# Patient Record
Sex: FEMALE | Race: WHITE | NOT HISPANIC OR LATINO | Employment: OTHER | URBAN - METROPOLITAN AREA
[De-identification: names, ages, dates, MRNs, and addresses within clinical notes are randomized per-mention and may not be internally consistent; named-entity substitution may affect disease eponyms.]

---

## 2018-09-28 ENCOUNTER — OFFICE VISIT CONVERTED (OUTPATIENT)
Dept: FAMILY MEDICINE CLINIC | Facility: CLINIC | Age: 63
End: 2018-09-28
Attending: NURSE PRACTITIONER

## 2018-12-06 ENCOUNTER — CONVERSION ENCOUNTER (OUTPATIENT)
Dept: OTHER | Facility: HOSPITAL | Age: 63
End: 2018-12-06

## 2018-12-06 ENCOUNTER — OFFICE VISIT CONVERTED (OUTPATIENT)
Dept: CARDIOLOGY | Facility: CLINIC | Age: 63
End: 2018-12-06
Attending: INTERNAL MEDICINE

## 2019-01-02 ENCOUNTER — OFFICE VISIT CONVERTED (OUTPATIENT)
Dept: FAMILY MEDICINE CLINIC | Facility: CLINIC | Age: 64
End: 2019-01-02
Attending: NURSE PRACTITIONER

## 2019-03-20 ENCOUNTER — OFFICE VISIT CONVERTED (OUTPATIENT)
Dept: OTOLARYNGOLOGY | Facility: CLINIC | Age: 64
End: 2019-03-20
Attending: OTOLARYNGOLOGY

## 2019-04-29 ENCOUNTER — CONVERSION ENCOUNTER (OUTPATIENT)
Dept: OTOLARYNGOLOGY | Facility: CLINIC | Age: 64
End: 2019-04-29

## 2019-04-29 ENCOUNTER — OFFICE VISIT CONVERTED (OUTPATIENT)
Dept: OTOLARYNGOLOGY | Facility: CLINIC | Age: 64
End: 2019-04-29
Attending: OTOLARYNGOLOGY

## 2019-07-02 ENCOUNTER — CONVERSION ENCOUNTER (OUTPATIENT)
Dept: FAMILY MEDICINE CLINIC | Facility: CLINIC | Age: 64
End: 2019-07-02

## 2019-07-02 ENCOUNTER — OFFICE VISIT CONVERTED (OUTPATIENT)
Dept: FAMILY MEDICINE CLINIC | Facility: CLINIC | Age: 64
End: 2019-07-02
Attending: NURSE PRACTITIONER

## 2019-12-23 ENCOUNTER — HOSPITAL ENCOUNTER (OUTPATIENT)
Dept: FAMILY MEDICINE CLINIC | Facility: CLINIC | Age: 64
Discharge: HOME | End: 2019-12-23
Attending: NURSE PRACTITIONER

## 2019-12-23 ENCOUNTER — OFFICE VISIT CONVERTED (OUTPATIENT)
Dept: FAMILY MEDICINE CLINIC | Facility: CLINIC | Age: 64
End: 2019-12-23
Attending: NURSE PRACTITIONER

## 2019-12-24 LAB
CONV LAST MENSTURAL PERIOD: NORMAL
SPECIMEN SOURCE: NORMAL
SPECIMEN SOURCE: NORMAL
THIN PREP CVX: NORMAL

## 2020-08-04 ENCOUNTER — OFFICE VISIT CONVERTED (OUTPATIENT)
Dept: FAMILY MEDICINE CLINIC | Facility: CLINIC | Age: 65
End: 2020-08-04
Attending: NURSE PRACTITIONER

## 2021-02-09 ENCOUNTER — HOSPITAL ENCOUNTER (OUTPATIENT)
Dept: LAB | Facility: HOSPITAL | Age: 66
Discharge: HOME | End: 2021-02-09
Attending: NURSE PRACTITIONER

## 2021-02-09 LAB
ALBUMIN SERPL-MCNC: 4.3 G/DL (ref 3.5–5)
ALBUMIN/GLOB SERPL: 2 {RATIO} (ref 1.4–2.6)
ALP SERPL-CCNC: 57 U/L (ref 43–160)
ALT SERPL-CCNC: 11 U/L (ref 10–40)
ANION GAP SERPL CALC-SCNC: 13 MMOL/L (ref 8–19)
AST SERPL-CCNC: 18 U/L (ref 15–50)
BASOPHILS # BLD AUTO: 0.04 10*3/UL (ref 0–0.2)
BASOPHILS NFR BLD AUTO: 0.8 % (ref 0–3)
BILIRUB SERPL-MCNC: 0.45 MG/DL (ref 0.2–1.3)
BUN SERPL-MCNC: 13 MG/DL (ref 5–25)
BUN/CREAT SERPL: 15 {RATIO} (ref 6–20)
CALCIUM SERPL-MCNC: 9.3 MG/DL (ref 8.7–10.4)
CHLORIDE SERPL-SCNC: 104 MMOL/L (ref 99–111)
CHOLEST SERPL-MCNC: 226 MG/DL (ref 107–200)
CHOLEST/HDLC SERPL: 3.1 {RATIO} (ref 3–6)
CONV ABS IMM GRAN: 0.02 10*3/UL (ref 0–0.2)
CONV CO2: 28 MMOL/L (ref 22–32)
CONV IMMATURE GRAN: 0.4 % (ref 0–1.8)
CONV TOTAL PROTEIN: 6.4 G/DL (ref 6.3–8.2)
CREAT UR-MCNC: 0.86 MG/DL (ref 0.5–0.9)
DEPRECATED RDW RBC AUTO: 43.6 FL (ref 36.4–46.3)
EOSINOPHIL # BLD AUTO: 0.1 10*3/UL (ref 0–0.7)
EOSINOPHIL # BLD AUTO: 2.1 % (ref 0–7)
ERYTHROCYTE [DISTWIDTH] IN BLOOD BY AUTOMATED COUNT: 12.6 % (ref 11.7–14.4)
GFR SERPLBLD BASED ON 1.73 SQ M-ARVRAT: >60 ML/MIN/{1.73_M2}
GLOBULIN UR ELPH-MCNC: 2.1 G/DL (ref 2–3.5)
GLUCOSE SERPL-MCNC: 90 MG/DL (ref 65–99)
HCT VFR BLD AUTO: 43.1 % (ref 37–47)
HDLC SERPL-MCNC: 73 MG/DL (ref 40–60)
HGB BLD-MCNC: 13.9 G/DL (ref 12–16)
LDLC SERPL CALC-MCNC: 134 MG/DL (ref 70–100)
LYMPHOCYTES # BLD AUTO: 1.46 10*3/UL (ref 1–5)
LYMPHOCYTES NFR BLD AUTO: 30.9 % (ref 20–45)
MCH RBC QN AUTO: 30.3 PG (ref 27–31)
MCHC RBC AUTO-ENTMCNC: 32.3 G/DL (ref 33–37)
MCV RBC AUTO: 93.9 FL (ref 81–99)
MONOCYTES # BLD AUTO: 0.37 10*3/UL (ref 0.2–1.2)
MONOCYTES NFR BLD AUTO: 7.8 % (ref 3–10)
NEUTROPHILS # BLD AUTO: 2.73 10*3/UL (ref 2–8)
NEUTROPHILS NFR BLD AUTO: 58 % (ref 30–85)
NRBC CBCN: 0 % (ref 0–0.7)
OSMOLALITY SERPL CALC.SUM OF ELEC: 292 MOSM/KG (ref 273–304)
PLATELET # BLD AUTO: 235 10*3/UL (ref 130–400)
PMV BLD AUTO: 10.4 FL (ref 9.4–12.3)
POTASSIUM SERPL-SCNC: 4 MMOL/L (ref 3.5–5.3)
RBC # BLD AUTO: 4.59 10*6/UL (ref 4.2–5.4)
SODIUM SERPL-SCNC: 141 MMOL/L (ref 135–147)
TRIGL SERPL-MCNC: 94 MG/DL (ref 40–150)
TSH SERPL-ACNC: 0.8 M[IU]/L (ref 0.27–4.2)
VLDLC SERPL-MCNC: 19 MG/DL (ref 5–37)
WBC # BLD AUTO: 4.72 10*3/UL (ref 4.8–10.8)

## 2021-02-10 ENCOUNTER — HOSPITAL ENCOUNTER (OUTPATIENT)
Dept: VACCINE CLINIC | Facility: HOSPITAL | Age: 66
Discharge: HOME | End: 2021-02-10
Attending: INTERNAL MEDICINE

## 2021-02-24 ENCOUNTER — CONVERSION ENCOUNTER (OUTPATIENT)
Dept: FAMILY MEDICINE CLINIC | Facility: CLINIC | Age: 66
End: 2021-02-24

## 2021-02-24 ENCOUNTER — HOSPITAL ENCOUNTER (OUTPATIENT)
Dept: FAMILY MEDICINE CLINIC | Facility: CLINIC | Age: 66
Discharge: HOME | End: 2021-02-24
Attending: NURSE PRACTITIONER

## 2021-02-24 ENCOUNTER — OFFICE VISIT CONVERTED (OUTPATIENT)
Dept: FAMILY MEDICINE CLINIC | Facility: CLINIC | Age: 66
End: 2021-02-24
Attending: NURSE PRACTITIONER

## 2021-02-24 ENCOUNTER — HOSPITAL ENCOUNTER (OUTPATIENT)
Dept: GENERAL RADIOLOGY | Facility: HOSPITAL | Age: 66
Discharge: HOME | End: 2021-02-24
Attending: NURSE PRACTITIONER

## 2021-02-24 LAB
APPEARANCE UR: CLEAR
BILIRUB UR QL: NEGATIVE
COLOR UR: YELLOW
CONV COLLECTION SOURCE (UA): NORMAL
CONV UROBILINOGEN IN URINE BY AUTOMATED TEST STRIP: 0.2 {EHRLICHU}/DL (ref 0.1–1)
GLUCOSE UR QL: NEGATIVE MG/DL
HEMOCCULT STL QL IA: NEGATIVE
HGB UR QL STRIP: NEGATIVE
KETONES UR QL STRIP: NEGATIVE MG/DL
LEUKOCYTE ESTERASE UR QL STRIP: NEGATIVE
NITRITE UR QL STRIP: NEGATIVE
PH UR STRIP.AUTO: 7 [PH] (ref 5–8)
PROT UR QL: NEGATIVE MG/DL
SP GR UR: 1.01 (ref 1–1.03)

## 2021-02-25 ENCOUNTER — HOSPITAL ENCOUNTER (OUTPATIENT)
Dept: GENERAL RADIOLOGY | Facility: HOSPITAL | Age: 66
Discharge: HOME | End: 2021-02-25
Attending: NURSE PRACTITIONER

## 2021-02-26 LAB — BACTERIA UR CULT: NORMAL

## 2021-03-11 ENCOUNTER — HOSPITAL ENCOUNTER (OUTPATIENT)
Dept: VACCINE CLINIC | Facility: HOSPITAL | Age: 66
Discharge: HOME | End: 2021-03-11
Attending: INTERNAL MEDICINE

## 2021-03-15 ENCOUNTER — HOSPITAL ENCOUNTER (OUTPATIENT)
Dept: MRI IMAGING | Facility: HOSPITAL | Age: 66
Discharge: HOME | End: 2021-03-15
Attending: NURSE PRACTITIONER

## 2021-04-12 ENCOUNTER — OFFICE VISIT CONVERTED (OUTPATIENT)
Dept: ORTHOPEDIC SURGERY | Facility: CLINIC | Age: 66
End: 2021-04-12
Attending: ORTHOPAEDIC SURGERY

## 2021-05-11 NOTE — H&P
History and Physical      Patient Name: Jessie Peck   Patient ID: [id number]   Sex: Female   YOB: 1955    Primary Care Provider: Percy PERERA   Referring Provider: Percy PERERA    Visit Date: April 12, 2021    Provider: Edy Ortega MD   Location: [facility]   Location Address: [address] [place] [address]  [id number]   Location Phone: [phone]          Chief Complaint  · Right Shoulder Pain      History Of Present Illness  Jessie Peck is a 66 year old /White female who presents today to [facility].      Patient presents today for an evaluation of right shoulder pain. She states she was moving mulch last year in the spring resulting in right shoulder pain. She states shoulder pain has been on and off over the last year. She started having increasing pain recently but pain has subsided. She states she is managing her pain with Aspirin cream. She reports noticing pain with range of motion. She hasn't had any formal treatment done for her right shoulder.       Past Medical History  Cancer; Ear fullness, bilateral; Ear itching; Hearing loss; Hyperthyroidism; Osteopenia; Screening Mammogram; Skin cancer; Thyroid disorder; Tinnitus of both ears         Past Surgical History  Appendectomy; Colonoscopy         Medication List  Calcium 500 500 mg calcium (1,250 mg) oral tablet; levothyroxine 125 mcg oral tablet; Vitamin D3 1,000 unit oral capsule         Allergy List  NO KNOWN DRUG ALLERGIES       Allergies Reconciled  Family Medical History  Esophagus Neoplasm, Malignant; Stroke; Heart Disease; Cancer, Unspecified; Skin Carcinoma; Osteoporosis; Family history of Arthritis         Social History  Alcohol (Current some day); Alcohol Use (Current some day); lives with spouse; .; Recreational Drug Use (Never); Retired.; Tobacco (Never)         Immunizations  Name Date Admin   Influenza 11/01/2018         Review of  "Systems  · Constitutional  o Denies  o : fever, chills, weight loss  · Cardiovascular  o Denies  o : chest pain, shortness of breath  · Gastrointestinal  o Denies  o : liver disease, heartburn, nausea, blood in stools  · Genitourinary  o Denies  o : painful urination, blood in urine  · Integument  o Denies  o : rash, itching  · Neurologic  o Denies  o : headache, weakness, loss of consciousness  · Musculoskeletal  o Denies  o : painful, swollen joints  · Psychiatric  o Denies  o : drug/alcohol addiction, anxiety, depression      Vitals  Date Time BP Position Site L\R Cuff Size HR RR TEMP (F) WT  HT  BMI kg/m2 BSA m2 O2 Sat FR L/min FiO2        04/12/2021 02:41 PM      68 - R   146lbs 16oz 5'  6.5\" 23.37 1.77 98 %            Physical Examination  · Constitutional  o Appearance  o : well developed, well-nourished, no obvious deformities present  · Head and Face  o Head  o :   § Inspection  § : normocephalic  o Face  o :   § Inspection  § : no facial lesions  · Eyes  o Conjunctivae  o : conjunctivae normal  o Sclerae  o : sclerae white  · Ears, Nose, Mouth and Throat  o Ears  o :   § External Ears  § : appearance within normal limits  § Hearing  § : intact  o Nose  o :   § External Nose  § : appearance normal  · Neck  o Inspection/Palpation  o : normal appearance  o Range of Motion  o : full range of motion  · Respiratory  o Respiratory Effort  o : breathing unlabored  o Inspection of Chest  o : normal appearance  o Auscultation of Lungs  o : no audible wheezing or rales  · Cardiovascular  o Heart  o : regular rate  · Gastrointestinal  o Abdominal Examination  o : soft and non-tender  · Skin and Subcutaneous Tissue  o General Inspection  o : intact, no rashes  · Psychiatric  o General  o : Alert and oriented x3  o Judgement and Insight  o : judgment and insight intact  o Mood and Affect  o : mood normal, affect appropriate  · Right Shoulder  o Inspection  o : Sensation grossly intact. Neurovascular intact. Skin " intact. No swelling, skin discoloration or atrophy. Positive pulses. Good tone of deltoid, biceps, triceps, wrist extensors, and wrist flexors. Full forward flexion. Pain with empty can testing. 4 out of 5 RTC strength. Full cross body adduction. Pain with Hawkin's. Pain with Neer's. Tender biceps tendon. Non-tender shoulder.   · Imaging  o Imaging  o : 3/15/21 RIGHT SHOULDER MRI: 1. Moderate acromioclavicular and moderate glenohumeral osteoarthritis 2. Intrasubstance tears of the supraspinatus and subscapularis tendons. 3. Mild supraspinatus tendinopathy 4. Multiple split tears of the biceps long head tendon 5. Tears and degenerative changes of the glenoid labrum, as above 6. Small glenohumeral joint effusion 7. Edema within the deltoid muscle favored to represent a muscular strain. Denervation edema would also be in the differential.      2/24/21 RIGHT SHOULDER X-RAY: 1. No acute fracture or malalignment     2. Mild glenohumeral and acromioclavicular osteoarthritis     3. Indeterminate 0.5 cm nodular density in the mid right lung.  Follow-up chest PA and lateral is recommended to further evaluate.                Assessment  · Right shoulder pain, unspecified chronicity     719.41/M25.511  · Right Rotator Cuff Tendinitis     726.90/M77.9      Plan  · Medications  o Medications have been Reconciled  o Transition of Care or Provider Policy  · Instructions  o Dr. Ortega saw and examined the patient and agrees with plan.   o X-rays reviewed by Dr. Ortega.  o Reviewed the patient's Past Medical, Social, and Family history as well as the ROS at today's visit, no changes.  o Call or return if worsening symptoms.  o Follow Up PRN.  o Discussed treatment plans and diagnosis with the patient. Patient states she wanted to assure she didn't have any major damage to her shoulder. Patient states her symptoms have improved and she will hold off on an injection until symptoms worsen again.   o The above service was scribed by Reva  Rolando on my behalf and I attest to the accuracy of the note. [email or web address]  · Referrals  o ID: [id number] Date: 04/07/2021 Type: Inbound  Specialty: Orthopedic Surgery            Electronically Signed by: Reva Marshall-, Other -Author on April 15, 2021 09:15:28 AM  Electronically Co-signed by: Edy Ortega MD -Reviewer on April 18, 2021 07:57:14 PM

## 2021-05-13 NOTE — PROGRESS NOTES
Progress Note      Patient Name: Jessie Peck   Patient ID: [id number]   Sex: Female   YOB: 1955    Primary Care Provider: ePrcy PERERA   Referring Provider: Percy PERERA    Visit Date: August 4, 2020    Provider: TREVER Cheng   Location: [facility]   Location Address: [address] [place] [address], [address] [place] [address]  [id number]   Location Phone: [phone]          Chief Complaint     The patient is here for anxiety and she has bilateral foot pain for one month.       History Of Present Illness  Jessie Peck is a 65 year old /White female who presents for evaluation and treatment of:      insomnia and anxiety:  Having a lot of changes.  Loud air conditioners and  snoring and taking care of sons dogs so not getting sleeping well.    bilateral foot pain.  Pain when starts walking but gets better.  Sudden onset.  good shoes       Past Medical History  Disease Name Date Onset Notes   Ear fullness, bilateral --  --    Ear itching --  --    Hearing loss --  --    Hyperthyroidism --  --    Osteopenia --  --    Screening Mammogram 02/26/2019 --    Skin cancer --  --    Tinnitus of both ears --  --          Past Surgical History  Procedure Name Date Notes   Appendectomy 1967 --    Colonoscopy 2008, 1/19/2018 --          Medication List  Name Date Started Instructions   Calcium 500 500 mg calcium (1,250 mg) oral tablet  take 1 tablet by oral route daily   levothyroxine 125 mcg oral tablet 04/20/2020 take 1 tablet (125 mcg) by oral route once daily for 90 days   Shingrix (PF) 50 mcg/0.5 mL intramuscular suspension for reconstitution 01/02/2019 inject 0.5 milliliter (50 mcg) by intramuscular route once repeat 0.5 mL dose 2 to 6 months after the first dose for 1 day   Vitamin D3 1,000 unit oral capsule  take 1 capsule by oral route daily         Allergy List  Allergen Name Date Reaction Notes   NO KNOWN DRUG ALLERGIES --  --  --          Family Medical  "History  Disease Name Relative/Age Notes   Esophagus Neoplasm, Malignant Grandfather (maternal)/   --    Stroke Aunt/  Father/  Grandmother (maternal)/   --    Heart Disease Aunt/  Father/  Uncle/   --    Skin Carcinoma Father/80   --          Social History  Finding Status Start/Stop Quantity Notes   Alcohol Current some day --/-- --  1-4 drinks per week   Tobacco Never --/-- --  --          Immunizations  NameDate Admin Mfg Trade Name Lot Number Route Inj VIS Given VIS Publication   [id number]/01/2018 SKB Fluarix, quadrivalent, preservative free [id number] NE NE 07/02/2019    Comments:          Review of Systems  · Constitutional  o Denies  o : fever, fatigue, weight loss, weight gain  · Cardiovascular  o Denies  o : lower extremity edema, claudication, chest pressure, palpitations  · Respiratory  o Denies  o : shortness of breath, wheezing, cough, hemoptysis, dyspnea on exertion  · Gastrointestinal  o Denies  o : nausea, vomiting, diarrhea, constipation, abdominal pain  · Musculoskeletal  o Admits  o : foot pain  · Psychiatric  o Admits  o : anxiety      Vitals  Date Time BP Position Site L\R Cuff Size HR RR TEMP (F) WT  HT  BMI kg/m2 BSA m2 O2 Sat        08/04/2020 07:53 /88 Sitting    56 - R 16 98.8 153lbs 0oz 5'  6.5\" 24.32 1.8 98 %          Physical Examination  · Constitutional  o Appearance  o : well-nourished, well developed, alert, in no acute distress  · Eyes  o Conjunctivae  o : conjunctivae normal  o Sclerae  o : sclerae white  o Pupils and Irises  o : pupils equal, round, and reactive to light and accommodation bilaterally  o Corneas  o : tear film normal, no lesions present  o Eyelids/Ocular Adnexae  o : eyelid appearance normal, no exudates present, eye moisture level normal  · Respiratory  o Respiratory Effort  o : breathing unlabored  o Inspection of Chest  o : normal appearance, no retractions  o Auscultation of Lungs  o : normal breath sounds throughout  · Cardiovascular  o Heart  o : "   § Auscultation of Heart  § : regular rate and rhythm without murmur, PMI normal  o Peripheral Vascular System  o :   § Extremities  § : no cyanosis, clubbing or edema; less than 2 second refill noted  · Musculoskeletal  o General  o : No joint swelling or deformity noted. Muscle tone, strength and development grossly normal.  · Skin and Subcutaneous Tissue  o General Inspection  o : no rashes or lesions present, no areas of discoloration  · Neurologic  o Mental Status Examination  o : judgement, insight intact, modd and affect appropriate  o Motor Examination  o : strength grossly intact in all four extremities  o Gait and Station  o : normal gait, able to stand without difficulty          Assessment  · Screening for depression     V79.0/Z13.89  · Insomnia, unspecified     780.52/G47.00  · Situational anxiety     300.09/F41.8  · Plantar fasciitis, bilateral     728.71/M72.2       Will try to retrain her sleep pattern.  To see if this helps and if she continues to have anxiety will put her on something that she can take during the day.  She concurs with this plan.       Plan  · Orders  o Annual depression screening, 15 minutes (, [address]6467) - V79.0/Z13.89 - 08/04/2020  o ACO-18: Negative screen for clinical depression using a standardized tool () - V79.0/Z13.89 - 08/04/2020   Insomnia and nerves over Covid.  o ACO-39: Current medications updated and reviewed () - - 08/04/2020  o ACO-14: Influenza immunization administered or previously received () - - 08/04/2020  o ACO-19: Colorectal cancer screening results documented and reviewed (3017F) - - 08/04/2020  · Medications  o hydroxyzine HCl 25 mg oral tablet   SIG: take 1 tablet by oral route once a day (at bedtime) for 30 days   DISP: (30) tablets with 1 refills  Prescribed on 08/04/2020     o Medications have been Reconciled  o Transition of Care or Provider Policy  · Instructions  o Depression Screen completed and scanned into the EMR under the  designated folder within the patient's documents.  o Today's PHQ-9 result is ___  o The provider screening met the required time of 15 minutes.  o Patient was educated/instructed on their diagnosis, treatment and medications prior to discharge from the clinic today.  o Minutes spent with patient including greater than 50% in Education/Counseling/Care Coordination.  o Time spent with the patient was minutes, more than 50% face to face.            Electronically Signed by: TREVER Cheng -Author on August 4, 2020 08:40:30 AM

## 2021-05-14 VITALS
HEIGHT: 66 IN | HEART RATE: 80 BPM | DIASTOLIC BLOOD PRESSURE: 80 MMHG | OXYGEN SATURATION: 98 % | RESPIRATION RATE: 16 BRPM | BODY MASS INDEX: 23.63 KG/M2 | WEIGHT: 147 LBS | SYSTOLIC BLOOD PRESSURE: 122 MMHG | TEMPERATURE: 98.7 F

## 2021-05-14 VITALS — OXYGEN SATURATION: 98 % | WEIGHT: 147 LBS | BODY MASS INDEX: 23.63 KG/M2 | HEART RATE: 68 BPM | HEIGHT: 66 IN

## 2021-05-14 NOTE — PROGRESS NOTES
Progress Note      Patient Name: Jessie Peck   Patient ID: [id number]   Sex: Female   YOB: 1955    Primary Care Provider: Percy PERERA   Referring Provider: Percy PERERA    Visit Date: February 24, 2021    Provider: TREVER Cheng   Location: [facility]   Location Address: [address] [place] [address], [address]  [id number]   Location Phone: [phone]          Chief Complaint  · Annual Exam  · PAP exam  · (Health Maintainence Information Reviewed Under Results)  · f/u hypthyroid  · urine incontinence      History Of Present Illness  Jessie Peck is a 65 year old /White female who presents for evaluation and treatment of:   No current complaints.      If the x-ray is negative may need to get an MRI.  Some urine incontinence.  Wants to to have a urine checked.    Discussed her elevated cholesterol.  All the rest of her labs were normal and within range.  And she has been trying to change some dietary options to be able to help lower that.    Not having any other issues.  Hypothyroidism she is stable    Not having any issues.  Last colonoscopy was at 62.  Does not plan another till she is 72..       Past Medical History  Disease Name Date Onset Notes   Ear fullness, bilateral --  --    Ear itching --  --    Hearing loss --  --    Hyperthyroidism --  --    Osteopenia --  --    Screening Mammogram 02/26/2019 --    Skin cancer --  --    Tinnitus of both ears --  --          Past Surgical History  Procedure Name Date Notes   Appendectomy 1967 --    Colonoscopy 2008, 1/19/2018 --          Medication List  Name Date Started Instructions   Calcium 500 500 mg calcium (1,250 mg) oral tablet  take 1 tablet by oral route daily   hydroxyzine HCl 25 mg oral tablet 08/04/2020 take 1 tablet by oral route once a day (at bedtime) for 30 days   levothyroxine 125 mcg oral tablet 10/19/2020 take 1 tablet (125 mcg) by oral route once daily for 90 days  "  Shingrix (PF) 50 mcg/0.5 mL intramuscular suspension for reconstitution 01/02/2019 inject 0.5 milliliter (50 mcg) by intramuscular route once repeat 0.5 mL dose 2 to 6 months after the first dose for 1 day   Vitamin D3 1,000 unit oral capsule  take 1 capsule by oral route daily         Allergy List  Allergen Name Date Reaction Notes   NO KNOWN DRUG ALLERGIES --  --  --          Family Medical History  Disease Name Relative/Age Notes   Esophagus Neoplasm, Malignant Grandfather (maternal)/   --    Stroke Aunt/  Father/  Grandmother (maternal)/   --    Heart Disease Aunt/  Father/  Uncle/   --    Skin Carcinoma Father/80   --          Social History  Finding Status Start/Stop Quantity Notes   Alcohol Current some day --/-- --  1-4 drinks per week   Tobacco Never --/-- --  --          Immunizations  NameDate Admin Mfg Trade Name Lot Number Route Inj VIS Given VIS Publication   [id number]/01/2018 SKB Fluarix, quadrivalent, preservative free [id number] NE NE 07/02/2019    Comments:          Review of Systems  · Constitutional  o Denies  o : fever, fatigue, weight loss, weight gain  · Cardiovascular  o Denies  o : lower extremity edema, claudication, chest pressure, palpitations  · Respiratory  o Denies  o : shortness of breath, wheezing, cough, hemoptysis, dyspnea on exertion  · Gastrointestinal  o Denies  o : nausea, vomiting, diarrhea, constipation, abdominal pain  · Genitourinary  o Admits  o : incontinence      Vitals  Date Time BP Position Site L\R Cuff Size HR RR TEMP (F) WT  HT  BMI kg/m2 BSA m2 O2 Sat FR L/min FiO2 HC       02/24/2021 08:05 /80 Sitting    80 - R 16 98.7 146lbs 16oz 5'  6.5\" 23.37 1.77 98 %  21%          Physical Examination  · Constitutional  o Appearance  o : well-nourished, in no acute distress  · Neck  o Inspection/Palpation  o : normal appearance, no masses or tenderness, trachea midline  o Thyroid  o : gland size normal, nontender, no nodules or masses present on " palpation  · Respiratory  o Respiratory Effort  o : breathing unlabored  o Inspection of Chest  o : normal appearance  o Auscultation of Lungs  o : normal breath sounds throughout  · Cardiovascular  o Heart  o :   § Auscultation of Heart  § : regular rate and rhythm, no murmurs, gallops or rubs  · Breasts  o Inspection of Breasts  o : breasts symmetrical, no skin changes, no deformities present, no discharge present  o Palpation of Breasts, Axillae  o : no masses present on palpation, no breast tenderness  · Gastrointestinal  o Abdominal Examination  o : abdomen nontender to palpation, tone normal without rigidity or guarding, no masses present, normal bowel sounds  · Genitourinary  o External Genitalia  o : no inflammation, no lesions present  o Vagina  o : normal vaginal vault, no discharge present, no inflammatory lesions present, no masses present  o Bladder  o : nontender to palpation  o Cervix  o : appearance healthy, no lesions present, nontender to palpation, no discharges, no bleeding present, normal midline position  o Uterus  o : nontender to palpation, no masses present, position midline/midplane  o Adnexa  o : no tenderness or masses present on bimanual examination  o Anus  o : no inflammation or lesions present  o Perineum  o : perineum within normal limits  · Lymphatic  o Neck  o : no lymphadenopathy present  o Axilla  o : no lymphadenopathy present  o Groin  o : no lymphadenopathy present  · Neurologic  o Mental Status Examination  o :   § Orientation  § : grossly oriented to person, place and time  o Gait and Station  o : normal gait, able to stand without difficulty  · Psychiatric  o Judgement and Insight  o : judgment and insight intact  o Mood and Affect  o : mood normal, affect appropriate          Results  · In-Office Procedures  o Lab procedure  § IOP - Fecal Occult Blood Testing (53754)   § Hemocult Stl Ql: Negative       Assessment  · Hypothyroidism     244.9/E03.9  · Routine gynecological  examination     V72.31/Z01.419  · Pap Smear     V76.2/Z01.419  · Urine incontinence     788.30/R32  · Urine troubles     V47.4/R39.89  · Shoulder pain, right     719.41/M25.511       Since shoulder pain has been going on for a year off and on.       Plan  · Orders  o Pap smear (47928) - V76.2/Z01.419 - 02/24/2021  o Urinalysis with Reflex Microscopy ([facility]) (57104) - 788.30/R32, V47.4/R39.89 - 02/24/2021  o Urine culture (73600, 68604) - 788.30/R32, V47.4/R39.89 - 02/24/2021  o ACO-14: Influenza immunization administered or previously received [facility] () - - 02/24/2021  o ACO-39: Current medications updated and reviewed (, 1159F) - - 02/24/2021  o Shoulder (Right) 2 or more views X-Ray [facility] Preferred View ([id number]) - 719.41/M25.511 - 02/24/2021  · Medications  o Medications have been Reconciled  o Transition of Care or Provider Policy  · Instructions  o **Pap Test/Liquid Based:   o Thin Prep  o Source:   o Cervix  o ********  o **Perform Reflex Human Papilloma Virus (HPV) High Risk on this Pap (If atypical squamous cells of the undetermined signifigcance (ASCUS)/Atypical Glandular Cells of undetermined significance (AGCUS): Low Grade Squamous Intraepitheal lesion (LGSIL):yes **  o No  o ********  o Medicare:  o **Is this an annual PAP:  o Yes  o **Clinical History  o Post Menopausal:  o Previous Pap date: 12/23/2019  o Normal  o Patient was educated/instructed on their diagnosis, treatment and medications prior to discharge from the clinic today.  o Counseled on monthly breast self exams.   o Counseled on STD prevention.  o Counseled on diet and exercise.   o Counseled on weight-bearing exercise.  o Recommended Calcium with Vitamin D twice daily.            Electronically Signed by: TREVER Cheng -Author on February 24, 2021 01:31:24 PM

## 2021-05-15 VITALS
TEMPERATURE: 98.8 F | HEIGHT: 66 IN | OXYGEN SATURATION: 98 % | BODY MASS INDEX: 24.59 KG/M2 | HEART RATE: 56 BPM | WEIGHT: 153 LBS | RESPIRATION RATE: 16 BRPM | DIASTOLIC BLOOD PRESSURE: 88 MMHG | SYSTOLIC BLOOD PRESSURE: 130 MMHG

## 2021-05-15 VITALS
HEIGHT: 66 IN | SYSTOLIC BLOOD PRESSURE: 132 MMHG | BODY MASS INDEX: 23.63 KG/M2 | DIASTOLIC BLOOD PRESSURE: 90 MMHG | WEIGHT: 147 LBS | HEART RATE: 62 BPM

## 2021-05-15 VITALS
BODY MASS INDEX: 24.31 KG/M2 | HEIGHT: 66 IN | WEIGHT: 151.25 LBS | DIASTOLIC BLOOD PRESSURE: 66 MMHG | HEART RATE: 51 BPM | OXYGEN SATURATION: 98 % | TEMPERATURE: 98.1 F | RESPIRATION RATE: 12 BRPM | SYSTOLIC BLOOD PRESSURE: 112 MMHG

## 2021-05-15 VITALS
SYSTOLIC BLOOD PRESSURE: 119 MMHG | BODY MASS INDEX: 24.35 KG/M2 | RESPIRATION RATE: 15 BRPM | DIASTOLIC BLOOD PRESSURE: 77 MMHG | HEIGHT: 66 IN | HEART RATE: 63 BPM | TEMPERATURE: 98.7 F | WEIGHT: 151.5 LBS | OXYGEN SATURATION: 100 %

## 2021-05-15 VITALS
OXYGEN SATURATION: 96 % | TEMPERATURE: 99.1 F | WEIGHT: 151.12 LBS | BODY MASS INDEX: 24.29 KG/M2 | HEIGHT: 66 IN | DIASTOLIC BLOOD PRESSURE: 72 MMHG | SYSTOLIC BLOOD PRESSURE: 110 MMHG | HEART RATE: 71 BPM | RESPIRATION RATE: 16 BRPM

## 2021-05-15 VITALS
DIASTOLIC BLOOD PRESSURE: 64 MMHG | RESPIRATION RATE: 16 BRPM | OXYGEN SATURATION: 97 % | TEMPERATURE: 98.6 F | WEIGHT: 151 LBS | HEART RATE: 70 BPM | BODY MASS INDEX: 24.27 KG/M2 | SYSTOLIC BLOOD PRESSURE: 124 MMHG | HEIGHT: 66 IN

## 2021-05-15 VITALS
OXYGEN SATURATION: 99 % | WEIGHT: 148.12 LBS | BODY MASS INDEX: 23.81 KG/M2 | HEART RATE: 59 BPM | TEMPERATURE: 96.9 F | DIASTOLIC BLOOD PRESSURE: 70 MMHG | HEIGHT: 66 IN | RESPIRATION RATE: 16 BRPM | SYSTOLIC BLOOD PRESSURE: 108 MMHG

## 2021-05-16 VITALS
SYSTOLIC BLOOD PRESSURE: 123 MMHG | TEMPERATURE: 98.8 F | WEIGHT: 144 LBS | RESPIRATION RATE: 16 BRPM | BODY MASS INDEX: 22.6 KG/M2 | OXYGEN SATURATION: 97 % | HEART RATE: 57 BPM | DIASTOLIC BLOOD PRESSURE: 74 MMHG | HEIGHT: 67 IN

## 2021-06-18 ENCOUNTER — TELEPHONE (OUTPATIENT)
Dept: FAMILY MEDICINE CLINIC | Facility: CLINIC | Age: 66
End: 2021-06-18

## 2021-06-18 NOTE — TELEPHONE ENCOUNTER
Caller: Jessie Peck    Relationship to patient: Self    Best call back number: [phone]     Patient is needing: PATIENT STATED THAT SHE HAD CALLED EARLIER TO TRY AND GET AN APPOINTMENT TODAY BECAUSE SHE IS LEAVING TOWN TOMORROW, BUT NONE WERE AVAILABLE TODAY AND SHE WAS ADVISED TO TRY AND GET AN APPOINTMENT WITH [facility] WITH DR. WILLOW FITZPATRICK SPECIFICALLY, BUT NEEDS A REFERRAL IN ORDER TO GO TO HER APPOINTMENT THIS AFTERNOON AT 2:30. PATIENT IS REQUESTING THIS BE TAKEN CARE OF AS SOON AS POSSIBLE SO THAT SHE IS ABLE TO KEEP HER APPOINTMENT    THE FAX NUMBER FOR THAT OFFICE IS: [phone]

## 2021-09-29 ENCOUNTER — TELEPHONE (OUTPATIENT)
Dept: FAMILY MEDICINE CLINIC | Facility: CLINIC | Age: 66
End: 2021-09-29

## 2021-09-29 DIAGNOSIS — F41.9 ANXIETY: Primary | ICD-10-CM

## 2021-09-29 DIAGNOSIS — E03.9 HYPOTHYROIDISM, UNSPECIFIED TYPE: Primary | ICD-10-CM

## 2021-09-29 RX ORDER — LEVOTHYROXINE SODIUM 0.12 MG/1
TABLET ORAL
COMMUNITY
Start: 2021-04-01 | End: 2021-09-29 | Stop reason: SDUPTHER

## 2021-09-29 RX ORDER — BUSPIRONE HYDROCHLORIDE 5 MG/1
5 TABLET ORAL 3 TIMES DAILY
Qty: 90 TABLET | Refills: 0 | Status: SHIPPED | OUTPATIENT
Start: 2021-09-29 | End: 2022-07-13

## 2021-09-29 RX ORDER — LEVOTHYROXINE SODIUM 0.12 MG/1
125 TABLET ORAL
Qty: 90 TABLET | Refills: 1 | Status: SHIPPED | OUTPATIENT
Start: 2021-09-29 | End: 2022-04-12 | Stop reason: SDUPTHER

## 2021-09-29 NOTE — TELEPHONE ENCOUNTER
Caller: Jessie Peck    Relationship: Self    Best call back number: [phone]     RD STATED THAT SHE IS NOT SLEEPING GOOD. SHE STATED SHE IS UNDER A LOT OF STRESS. SHE WANTED TO ASK MS. SEPIDEH ABOUT ANTI STRESS OR ANXIETY MEDICATION TO ASSIST HER. PATIENT WAS OFFERED AN APPOINTMENT TO COME INTO DISCUSS THIS, BUT DUE TO HER SON BEING ILL, SHE WOULD LIKE TO AVOID COMING INTO THRE OFFICE IF AT ALL POSSIBLE. PLEASE ADVISE     Do you require a callback: YES

## 2021-09-29 NOTE — TELEPHONE ENCOUNTER
Caller: Jessie Peck    Relationship: Self    Medication requested (name and dosage): CYNTHROID 125 MG     Pharmacy where request should be sent: [facility]     Best call back number: [phone]     Does the patient have less than a 3 day supply:  [x] Yes  [x] No    Jose R Groves Rep   09/29/21 12:28 EDT

## 2021-10-07 ENCOUNTER — TELEPHONE (OUTPATIENT)
Dept: FAMILY MEDICINE CLINIC | Facility: CLINIC | Age: 66
End: 2021-10-07

## 2021-10-07 NOTE — TELEPHONE ENCOUNTER
Patient just received a letter after donating blood somewhere, that she has HEP C. She was wanting to talk to someone regarding it, and to see if this coul be a mistake. And possibly have blood work done to confirm or deny this.

## 2021-10-08 ENCOUNTER — LAB (OUTPATIENT)
Dept: LAB | Facility: HOSPITAL | Age: 66
End: 2021-10-08

## 2021-10-08 DIAGNOSIS — Z11.59 NEED FOR HEPATITIS C SCREENING TEST: Primary | ICD-10-CM

## 2021-10-08 DIAGNOSIS — Z11.59 NEED FOR HEPATITIS C SCREENING TEST: ICD-10-CM

## 2021-10-08 PROCEDURE — 36415 COLL VENOUS BLD VENIPUNCTURE: CPT

## 2021-10-08 PROCEDURE — 86803 HEPATITIS C AB TEST: CPT

## 2021-10-09 LAB — HCV AB SER DONR QL: REACTIVE

## 2021-10-10 ENCOUNTER — APPOINTMENT (OUTPATIENT)
Dept: GENERAL RADIOLOGY | Facility: HOSPITAL | Age: 66
End: 2021-10-10

## 2021-10-10 ENCOUNTER — HOSPITAL ENCOUNTER (EMERGENCY)
Facility: HOSPITAL | Age: 66
Discharge: HOME | End: 2021-10-10
Attending: EMERGENCY MEDICINE | Admitting: EMERGENCY MEDICINE

## 2021-10-10 VITALS
WEIGHT: 138.89 LBS | RESPIRATION RATE: 22 BRPM | BODY MASS INDEX: 22.32 KG/M2 | SYSTOLIC BLOOD PRESSURE: 110 MMHG | TEMPERATURE: 97.9 F | DIASTOLIC BLOOD PRESSURE: 76 MMHG | HEIGHT: 66 IN | OXYGEN SATURATION: 96 % | HEART RATE: 55 BPM

## 2021-10-10 DIAGNOSIS — F41.9 ANXIETY: Primary | ICD-10-CM

## 2021-10-10 LAB
ALBUMIN SERPL-MCNC: 4.4 G/DL (ref 3.5–5.2)
ALBUMIN/GLOB SERPL: 2.2 G/DL
ALP SERPL-CCNC: 48 U/L (ref 39–117)
ALT SERPL W P-5'-P-CCNC: 8 U/L (ref 1–33)
ANION GAP SERPL CALCULATED.3IONS-SCNC: 8.6 MMOL/L (ref 5–15)
AST SERPL-CCNC: 14 U/L (ref 1–32)
BASOPHILS # BLD AUTO: 0.03 10*3/MM3 (ref 0–0.2)
BASOPHILS NFR BLD AUTO: 0.6 % (ref 0–1.5)
BILIRUB SERPL-MCNC: 0.4 MG/DL (ref 0–1.2)
BUN SERPL-MCNC: 14 MG/DL (ref 8–23)
BUN/CREAT SERPL: 15.7 (ref 7–25)
CALCIUM SPEC-SCNC: 9.1 MG/DL (ref 8.6–10.5)
CHLORIDE SERPL-SCNC: 108 MMOL/L (ref 98–107)
CK MB SERPL-CCNC: 1.74 NG/ML
CK SERPL-CCNC: 85 U/L (ref 20–180)
CO2 SERPL-SCNC: 26.4 MMOL/L (ref 22–29)
CREAT SERPL-MCNC: 0.89 MG/DL (ref 0.57–1)
DEPRECATED RDW RBC AUTO: 41.7 FL (ref 37–54)
EOSINOPHIL # BLD AUTO: 0.11 10*3/MM3 (ref 0–0.4)
EOSINOPHIL NFR BLD AUTO: 2.2 % (ref 0.3–6.2)
ERYTHROCYTE [DISTWIDTH] IN BLOOD BY AUTOMATED COUNT: 12.5 % (ref 12.3–15.4)
GFR SERPL CREATININE-BSD FRML MDRD: 64 ML/MIN/1.73
GLOBULIN UR ELPH-MCNC: 2 GM/DL
GLUCOSE SERPL-MCNC: 103 MG/DL (ref 65–99)
HCT VFR BLD AUTO: 39.6 % (ref 34–46.6)
HGB BLD-MCNC: 13.5 G/DL (ref 12–15.9)
HOLD SPECIMEN: NORMAL
IMM GRANULOCYTES # BLD AUTO: 0.01 10*3/MM3 (ref 0–0.05)
IMM GRANULOCYTES NFR BLD AUTO: 0.2 % (ref 0–0.5)
LIPASE SERPL-CCNC: 62 U/L (ref 13–60)
LYMPHOCYTES # BLD AUTO: 1.35 10*3/MM3 (ref 0.7–3.1)
LYMPHOCYTES NFR BLD AUTO: 26.7 % (ref 19.6–45.3)
MAGNESIUM SERPL-MCNC: 2.3 MG/DL (ref 1.6–2.4)
MCH RBC QN AUTO: 31.2 PG (ref 26.6–33)
MCHC RBC AUTO-ENTMCNC: 34.1 G/DL (ref 31.5–35.7)
MCV RBC AUTO: 91.5 FL (ref 79–97)
MONOCYTES # BLD AUTO: 0.37 10*3/MM3 (ref 0.1–0.9)
MONOCYTES NFR BLD AUTO: 7.3 % (ref 5–12)
NEUTROPHILS NFR BLD AUTO: 3.18 10*3/MM3 (ref 1.7–7)
NEUTROPHILS NFR BLD AUTO: 63 % (ref 42.7–76)
NRBC BLD AUTO-RTO: 0 /100 WBC (ref 0–0.2)
NT-PROBNP SERPL-MCNC: 91.3 PG/ML (ref 0–900)
PLATELET # BLD AUTO: 218 10*3/MM3 (ref 140–450)
PMV BLD AUTO: 9.7 FL (ref 6–12)
POTASSIUM SERPL-SCNC: 3.6 MMOL/L (ref 3.5–5.2)
PROT SERPL-MCNC: 6.4 G/DL (ref 6–8.5)
RBC # BLD AUTO: 4.33 10*6/MM3 (ref 3.77–5.28)
SODIUM SERPL-SCNC: 143 MMOL/L (ref 136–145)
TROPONIN I SERPL-MCNC: 0.01 NG/ML (ref 0–0.6)
TROPONIN I SERPL-MCNC: 0.01 NG/ML (ref 0–0.6)
WBC # BLD AUTO: 5.05 10*3/MM3 (ref 3.4–10.8)
WHOLE BLOOD HOLD SPECIMEN: NORMAL
WHOLE BLOOD HOLD SPECIMEN: NORMAL

## 2021-10-10 PROCEDURE — 96374 THER/PROPH/DIAG INJ IV PUSH: CPT

## 2021-10-10 PROCEDURE — 93005 ELECTROCARDIOGRAM TRACING: CPT

## 2021-10-10 PROCEDURE — 80053 COMPREHEN METABOLIC PANEL: CPT | Performed by: EMERGENCY MEDICINE

## 2021-10-10 PROCEDURE — 83880 ASSAY OF NATRIURETIC PEPTIDE: CPT | Performed by: EMERGENCY MEDICINE

## 2021-10-10 PROCEDURE — 99283 EMERGENCY DEPT VISIT LOW MDM: CPT

## 2021-10-10 PROCEDURE — 83690 ASSAY OF LIPASE: CPT | Performed by: EMERGENCY MEDICINE

## 2021-10-10 PROCEDURE — 83735 ASSAY OF MAGNESIUM: CPT | Performed by: EMERGENCY MEDICINE

## 2021-10-10 PROCEDURE — 93010 ELECTROCARDIOGRAM REPORT: CPT | Performed by: INTERNAL MEDICINE

## 2021-10-10 PROCEDURE — 84484 ASSAY OF TROPONIN QUANT: CPT

## 2021-10-10 PROCEDURE — 25010000002 LORAZEPAM PER 2 MG: Performed by: EMERGENCY MEDICINE

## 2021-10-10 PROCEDURE — 82550 ASSAY OF CK (CPK): CPT | Performed by: EMERGENCY MEDICINE

## 2021-10-10 PROCEDURE — 85025 COMPLETE CBC W/AUTO DIFF WBC: CPT | Performed by: EMERGENCY MEDICINE

## 2021-10-10 PROCEDURE — 71045 X-RAY EXAM CHEST 1 VIEW: CPT

## 2021-10-10 PROCEDURE — 82553 CREATINE MB FRACTION: CPT | Performed by: EMERGENCY MEDICINE

## 2021-10-10 PROCEDURE — 93005 ELECTROCARDIOGRAM TRACING: CPT | Performed by: EMERGENCY MEDICINE

## 2021-10-10 RX ORDER — SODIUM CHLORIDE 0.9 % (FLUSH) 0.9 %
10 SYRINGE (ML) INJECTION AS NEEDED
Status: DISCONTINUED | OUTPATIENT
Start: 2021-10-10 | End: 2021-10-10 | Stop reason: HOSPADM

## 2021-10-10 RX ORDER — LORAZEPAM 2 MG/ML
1 INJECTION INTRAMUSCULAR ONCE
Status: COMPLETED | OUTPATIENT
Start: 2021-10-10 | End: 2021-10-10

## 2021-10-10 RX ORDER — HYDROXYZINE HYDROCHLORIDE 25 MG/1
25 TABLET, FILM COATED ORAL 3 TIMES DAILY PRN
Qty: 16 TABLET | Refills: 0 | Status: SHIPPED | OUTPATIENT
Start: 2021-10-10 | End: 2022-07-13 | Stop reason: SDUPTHER

## 2021-10-10 RX ORDER — ASPIRIN 81 MG/1
324 TABLET, CHEWABLE ORAL ONCE
Status: COMPLETED | OUTPATIENT
Start: 2021-10-10 | End: 2021-10-10

## 2021-10-10 RX ADMIN — LORAZEPAM 1 MG: 2 INJECTION INTRAMUSCULAR; INTRAVENOUS at 09:05

## 2021-10-10 RX ADMIN — ASPIRIN 324 MG: 81 TABLET, CHEWABLE ORAL at 07:48

## 2021-10-10 NOTE — ED PROVIDER NOTES
Time: 08:30 EDT  Arrived by: private vehicle  Chief Complaint: chest pain  History provided by: patient  History is limited by: N/A    History of Present Illness:  Patient is a 66 y.o. year old female that presents to the emergency department with intermittent CHEST PAIN which began 3 days ago. Today's chest pain began at 0400. Pain is located over the center of her chest. Patient states she has pain when she is worried.      She complains of mild nausea and back pain. She denies any diaphoresis, vomiting or shortness of breath.     She denies HTN, HLD or DM. She is not a smoker. She states she has had a stress test in the past and it was normal. She reports her father has a history of CAD. She states many of her aunts have had strokes.     Chest Pain  Chest pain location: central chest.  Pain severity:  Moderate  Duration:  3 days  Timing:  Intermittent  Chronicity:  New  Context: stress    Associated symptoms: back pain and nausea    Associated symptoms: no abdominal pain, no cough, no fever, no headache, no palpitations, no shortness of breath and no vomiting    Risk factors: no diabetes mellitus, no high cholesterol, no hypertension and no smoking        Patient Care Team  Primary Care Provider: Percy Duke APRN      Past Medical History:     No Known Allergies  Past Medical History:   Diagnosis Date   • Cancer (HCC)    • Ear fullness, bilateral    • Ear itching    • Hearing loss    • Hyperthyroidism    • Osteopenia    • Skin cancer    • Thyroid disorder    • Tinnitus of both ears      Past Surgical History:   Procedure Laterality Date   • APPENDECTOMY  1967   • COLONOSCOPY  2018     Family History   Problem Relation Age of Onset   • Cancer Mother    • Osteoporosis Mother    • Arthritis Mother    • Stroke Father    • Heart disease Father    • Cancer Father    • Skin cancer Father 80   • Esophageal cancer Maternal Grandfather        Home Medications:  Prior to Admission medications    Medication Sig Start  "Date End Date Taking? Authorizing Provider   busPIRone (BUSPAR) 5 MG tablet Take 1 tablet by mouth 3 (Three) Times a Day. 9/29/21   Percy Duke APRN   levothyroxine (SYNTHROID, LEVOTHROID) 125 MCG tablet Take 1 tablet by mouth Every Morning. 9/29/21   Percy Duke APRN        Social History:   PT  reports that she has never smoked. She does not have any smokeless tobacco history on file. She reports current alcohol use. She reports that she does not use drugs.    Record Review:  I have reviewed the patient's records in [facility].     Review of Systems  Review of Systems   Constitutional: Negative for chills and fever.   HENT: Negative for congestion, rhinorrhea and sore throat.    Eyes: Negative for pain and visual disturbance.   Respiratory: Negative for apnea, cough, chest tightness and shortness of breath.    Cardiovascular: Positive for chest pain. Negative for palpitations.   Gastrointestinal: Positive for nausea. Negative for abdominal pain, diarrhea and vomiting.   Genitourinary: Negative for difficulty urinating and dysuria.   Musculoskeletal: Positive for back pain. Negative for joint swelling and myalgias.   Skin: Negative for color change.   Neurological: Negative for seizures and headaches.   Psychiatric/Behavioral: Negative.    All other systems reviewed and are negative.       Physical Exam  /87   Pulse 58   Temp 97.9 °F (36.6 °C) (Oral)   Resp 22   Ht 167.6 cm (66\")   Wt 63 kg (138 lb 14.2 oz)   SpO2 100%   BMI 22.42 kg/m²     Physical Exam  Vitals and nursing note reviewed.   Constitutional:       General: She is not in acute distress.     Appearance: Normal appearance. She is not toxic-appearing.   HENT:      Head: Normocephalic and atraumatic.      Jaw: There is normal jaw occlusion.   Eyes:      General: Lids are normal.      Extraocular Movements: Extraocular movements intact.      Conjunctiva/sclera: Conjunctivae normal.      Pupils: Pupils are equal, round, and reactive to light. " "  Cardiovascular:      Rate and Rhythm: Normal rate and regular rhythm.      Pulses: Normal pulses.      Heart sounds: Normal heart sounds.   Pulmonary:      Effort: Pulmonary effort is normal. No respiratory distress.      Breath sounds: Normal breath sounds. No wheezing or rhonchi.   Abdominal:      General: Abdomen is flat.      Palpations: Abdomen is soft.      Tenderness: There is no abdominal tenderness. There is no guarding or rebound.   Musculoskeletal:         General: Normal range of motion.      Cervical back: Normal range of motion and neck supple.      Right lower leg: No edema.      Left lower leg: No edema.   Skin:     General: Skin is warm and dry.   Neurological:      Mental Status: She is alert and oriented to person, place, and time. Mental status is at baseline.   Psychiatric:         Mood and Affect: Mood normal.                  ED Course  /87   Pulse 58   Temp 97.9 °F (36.6 °C) (Oral)   Resp 22   Ht 167.6 cm (66\")   Wt 63 kg (138 lb 14.2 oz)   SpO2 100%   BMI 22.42 kg/m²   Results for orders placed or performed during the hospital encounter of 10/10/21   Comprehensive Metabolic Panel    Specimen: Blood   Result Value Ref Range    Glucose 103 (H) 65 - 99 mg/dL    BUN 14 8 - 23 mg/dL    Creatinine 0.89 0.57 - 1.00 mg/dL    Sodium 143 136 - 145 mmol/L    Potassium 3.6 3.5 - 5.2 mmol/L    Chloride 108 (H) 98 - 107 mmol/L    CO2 26.4 22.0 - 29.0 mmol/L    Calcium 9.1 8.6 - 10.5 mg/dL    Total Protein 6.4 6.0 - 8.5 g/dL    Albumin 4.40 3.50 - 5.20 g/dL    ALT (SGPT) 8 1 - 33 U/L    AST (SGOT) 14 1 - 32 U/L    Alkaline Phosphatase 48 39 - 117 U/L    Total Bilirubin 0.4 0.0 - 1.2 mg/dL    eGFR Non African Amer 64 >60 mL/min/1.73    Globulin 2.0 gm/dL    A/G Ratio 2.2 g/dL    BUN/Creatinine Ratio 15.7 7.0 - 25.0    Anion Gap 8.6 5.0 - 15.0 mmol/L   Lipase    Specimen: Blood   Result Value Ref Range    Lipase 62 (H) 13 - 60 U/L   BNP    Specimen: Blood   Result Value Ref Range    proBNP " 91.3 0.0 - 900.0 pg/mL   Magnesium    Specimen: Blood   Result Value Ref Range    Magnesium 2.3 1.6 - 2.4 mg/dL   CK Total & CKMB    Specimen: Blood   Result Value Ref Range    CKMB 1.74 <=5.30 ng/mL    Creatine Kinase 85 20 - 180 U/L   CBC Auto Differential    Specimen: Blood   Result Value Ref Range    WBC 5.05 3.40 - 10.80 10*3/mm3    RBC 4.33 3.77 - 5.28 10*6/mm3    Hemoglobin 13.5 12.0 - 15.9 g/dL    Hematocrit 39.6 34.0 - 46.6 %    MCV 91.5 79.0 - 97.0 fL    MCH 31.2 26.6 - 33.0 pg    MCHC 34.1 31.5 - 35.7 g/dL    RDW 12.5 12.3 - 15.4 %    RDW-SD 41.7 37.0 - 54.0 fl    MPV 9.7 6.0 - 12.0 fL    Platelets 218 140 - 450 10*3/mm3    Neutrophil % 63.0 42.7 - 76.0 %    Lymphocyte % 26.7 19.6 - 45.3 %    Monocyte % 7.3 5.0 - 12.0 %    Eosinophil % 2.2 0.3 - 6.2 %    Basophil % 0.6 0.0 - 1.5 %    Immature Grans % 0.2 0.0 - 0.5 %    Neutrophils, Absolute 3.18 1.70 - 7.00 10*3/mm3    Lymphocytes, Absolute 1.35 0.70 - 3.10 10*3/mm3    Monocytes, Absolute 0.37 0.10 - 0.90 10*3/mm3    Eosinophils, Absolute 0.11 0.00 - 0.40 10*3/mm3    Basophils, Absolute 0.03 0.00 - 0.20 10*3/mm3    Immature Grans, Absolute 0.01 0.00 - 0.05 10*3/mm3    nRBC 0.0 0.0 - 0.2 /100 WBC   POC Troponin I    Specimen: Blood   Result Value Ref Range    Troponin I 0.01 0.00 - 0.60 ng/mL   POC Troponin I    Specimen: Blood   Result Value Ref Range    Troponin I 0.01 0.00 - 0.60 ng/mL   ECG 12 Lead   Result Value Ref Range    QT Interval 439 ms   ECG 12 Lead   Result Value Ref Range    QT Interval 454 ms   Green Top (Gel)   Result Value Ref Range    Extra Tube Hold for add-ons.    Lavender Top   Result Value Ref Range    Extra Tube hold for add-on    Gold Top - SST   Result Value Ref Range    Extra Tube Hold for add-ons.    Light Blue Top   Result Value Ref Range    Extra Tube hold for add-on    HOLD Troponin-I Tube   Result Value Ref Range    Extra Tube Hold for add-ons.      Medications   sodium chloride 0.9 % flush 10 mL (has no administration in  time range)   aspirin chewable tablet 324 mg (324 mg Oral Given 10/10/21 1305)   LORazepam (ATIVAN) injection 1 mg (1 mg Intravenous Given 10/10/21 0905)     XR Chest 1 View    Result Date: 10/10/2021  Narrative: PROCEDURE: XR CHEST 1 VW  COMPARISON: [facility], CR, CHEST PA/AP & LAT 2V, 2/25/2021, 8:42.  INDICATIONS: MID CHEST PAIN, BACK PAIN, AND NAUSEA TODAY.  FINDINGS:  No cardiac enlargement.  There is mild degenerative changes of the shoulders.  No airspace disease.  No significant pleural effusion.  No acute osseous findings.  No other significant change.  CONCLUSION:  1. No acute disease or change.       AMANDA DEVI MD       Electronically Signed and Approved By: AMANDA DEVI MD on 10/10/2021 at 7:22             09:40 EDT: Patient was updated with results and treatment plan.       Procedures/EKGs:  Procedures        Rhythm: Sinus  Rate: 57  Axis: normal  Intervals: normal  ST Segment: No ST elevations    EKG Comparison: none    Interpreted by me    Medical Decision Making:                         MDM  Number of Diagnoses or Management Options  Anxiety  Diagnosis management comments: The patient had an EKG that shows no acute changes. Specifically, there are no ST elevations, t-wave changes of concern, delta waves, or rhythm abnormalities warranting admission. The patient was placed on the cardiac monitor and observed with continuous telemetry. The patient has a chest x-ray that is negative for pneumothorax, pneumonia, and is essentially unremarkable. The patient has had unelevated troponins on blood draw.      The patient is resting comfortably and feels better, is alert and in no distress.  The patient´s CBC was reviewed and shows no abnormalities of critical concern. Of note, there is no anemia requiring a blood transfusion and the platelet count is acceptable.  The patient´s CMP was reviewed and shows no abnormalities of critical concern. Of note, the patient´s sodium and  potassium are acceptable. The patient´s liver enzymes are unremarkable. The patient´s renal function (creatinine) is preserved. The patient has a normal anion gap.  The repeat examination is unremarkable and benign. Electrocardiogram shows no signs of acute ischemia and the history, exam, diagnostic testing and current condition did not suggest that this patient is having an acute myocardial infarction, significant arrhythmia, unstable angina, esophageal perforation, pulmonary embolism, aortic dissection, severe pneumonia, sepsis for other significant pathology that would warrant further testing, continued ED treatment, admission, cardiology or other specialist consultation at this point. The vital signs have been stable. The patient's condition is stable and appropriate for discharge. The patient will pursue further outpatient evaluation with the primary care physician, or designated physician or cardiologist. The patient has expressed a clear and thorough understanding and agreed to follow-up as instructed.       Amount and/or Complexity of Data Reviewed  Clinical lab tests: reviewed  Tests in the radiology section of CPT®: reviewed  Tests in the medicine section of CPT®: reviewed  Independent visualization of images, tracings, or specimens: yes    Risk of Complications, Morbidity, and/or Mortality  Presenting problems: moderate  Management options: moderate    Patient Progress  Patient progress: stable       Final diagnoses:   Anxiety          Disposition:  ED Disposition     ED Disposition Condition Comment    Discharge Stable           Documentation assistance provided by Karen Lopez acting as scribe for Dr Maryam Sales. Information recorded by the scribe was done at my direction and has been verified and validated by me.            Karen Lopez  10/10/21 0940       Maryam Sales MD  10/10/21 0915

## 2021-10-13 ENCOUNTER — TELEPHONE (OUTPATIENT)
Dept: FAMILY MEDICINE CLINIC | Facility: CLINIC | Age: 66
End: 2021-10-13

## 2021-10-13 DIAGNOSIS — B19.20 PCR POSITIVE FOR HEPATITIS C VIRUS: Primary | ICD-10-CM

## 2021-10-13 NOTE — TELEPHONE ENCOUNTER
PLEASE CALL HER IN RE TO THE HEP C TEST BE RAN PLEASE CALL IN RE TO THIS    NEEDS CONCLUSION FOR THIS PLEASE CALL, TODAY SHE IS EXPERIANCING ANXIETY OVER THIS AS WELL WANTS A CALL FROM [facility]

## 2021-10-13 NOTE — TELEPHONE ENCOUNTER
I spoke to pt and she said she prefers to be seen somewhere in [facility] and that she realizes after talking to a blood transfusion place that it just means she has been exposed, she has not been treated or evaluated in the past.

## 2021-10-15 ENCOUNTER — LAB (OUTPATIENT)
Dept: LAB | Facility: HOSPITAL | Age: 66
End: 2021-10-15

## 2021-10-15 DIAGNOSIS — B19.20 HEPATITIS C VIRUS INFECTION WITHOUT HEPATIC COMA, UNSPECIFIED CHRONICITY: ICD-10-CM

## 2021-10-15 DIAGNOSIS — B19.20 HEPATITIS C VIRUS INFECTION WITHOUT HEPATIC COMA, UNSPECIFIED CHRONICITY: Primary | ICD-10-CM

## 2021-10-15 PROCEDURE — 36415 COLL VENOUS BLD VENIPUNCTURE: CPT

## 2021-10-15 PROCEDURE — 87522 HEPATITIS C REVRS TRNSCRPJ: CPT

## 2021-10-19 LAB
HCV RNA SERPL NAA+PROBE-ACNC: NORMAL IU/ML
TEST INFORMATION: NORMAL

## 2021-10-29 LAB
QT INTERVAL: 439 MS
QT INTERVAL: 454 MS

## 2022-02-09 ENCOUNTER — TELEPHONE (OUTPATIENT)
Dept: FAMILY MEDICINE CLINIC | Facility: CLINIC | Age: 67
End: 2022-02-09

## 2022-02-09 DIAGNOSIS — Z12.31 ENCOUNTER FOR SCREENING MAMMOGRAM FOR MALIGNANT NEOPLASM OF BREAST: Primary | ICD-10-CM

## 2022-02-09 NOTE — TELEPHONE ENCOUNTER
Caller: Jessie Peck    Relationship: Self    Best call back number: [phone]     What is the medical concern/diagnosis: YEARLY CHECK     What specialty or service is being requested: MAMMOGRAM     What is the provider, practice or medical service name: [facility]     What is the office location:[address] [place] [address]    What is the office phone number: FAX: [phone]

## 2022-02-11 ENCOUNTER — OFFICE VISIT (OUTPATIENT)
Dept: FAMILY MEDICINE CLINIC | Facility: CLINIC | Age: 67
End: 2022-02-11

## 2022-02-11 VITALS
TEMPERATURE: 96.9 F | WEIGHT: 144.8 LBS | RESPIRATION RATE: 16 BRPM | OXYGEN SATURATION: 99 % | BODY MASS INDEX: 23.27 KG/M2 | SYSTOLIC BLOOD PRESSURE: 130 MMHG | HEART RATE: 75 BPM | HEIGHT: 66 IN | DIASTOLIC BLOOD PRESSURE: 86 MMHG

## 2022-02-11 DIAGNOSIS — E89.0 POSTABLATIVE HYPOTHYROIDISM: ICD-10-CM

## 2022-02-11 DIAGNOSIS — Z00.00 ANNUAL PHYSICAL EXAM: ICD-10-CM

## 2022-02-11 DIAGNOSIS — F41.9 ANXIETY: ICD-10-CM

## 2022-02-11 DIAGNOSIS — Z12.4 SCREENING FOR CERVICAL CANCER: Primary | ICD-10-CM

## 2022-02-11 DIAGNOSIS — E05.00 GRAVES DISEASE: ICD-10-CM

## 2022-02-11 DIAGNOSIS — M85.80 OSTEOPENIA, UNSPECIFIED LOCATION: ICD-10-CM

## 2022-02-11 DIAGNOSIS — N95.1 POSTMENOPAUSAL SYNDROME: ICD-10-CM

## 2022-02-11 PROBLEM — C44.90 SKIN CANCER: Status: ACTIVE | Noted: 2022-02-11

## 2022-02-11 PROBLEM — E07.9 THYROID DISORDER: Status: ACTIVE | Noted: 2022-02-11

## 2022-02-11 PROBLEM — H93.13 TINNITUS OF BOTH EARS: Status: ACTIVE | Noted: 2022-02-11

## 2022-02-11 PROBLEM — L29.9 PRURITUS: Status: ACTIVE | Noted: 2022-02-11

## 2022-02-11 PROCEDURE — 99214 OFFICE O/P EST MOD 30 MIN: CPT | Performed by: NURSE PRACTITIONER

## 2022-02-11 RX ORDER — IBUPROFEN 200 MG
CAPSULE ORAL
COMMUNITY

## 2022-02-11 RX ORDER — ESCITALOPRAM OXALATE 10 MG/1
10 TABLET ORAL DAILY
Qty: 30 TABLET | Refills: 1 | Status: SHIPPED | OUTPATIENT
Start: 2022-02-11 | End: 2022-04-12 | Stop reason: SDUPTHER

## 2022-02-11 RX ORDER — TRIAMCINOLONE ACETONIDE 0.1 %
PASTE (GRAM) DENTAL
COMMUNITY
Start: 2022-01-11 | End: 2023-01-13

## 2022-02-11 NOTE — PROGRESS NOTES
Chief Complaint  Annual Exam, Anxiety (f/u buspar not helping), and Gas    Subjective        Jessie Peck presents to [facility]  Jessie Peck 66 year old white female presents with anxiety and gas.    Hypothyroid: Doing well on medication but has been fatigued lately but has lots of stressors.  Son with recent diagnosis of leukemia. Will continue meds and check TSH.    Osteopenia: calcium encouraged. Will start on Vitamin D. Order Bone density test..      Anxiety:  buspar not helping.  New stressors. Will start on Zoloft.    Gas: New stressors in life.          Past History:    Medical History: has a past medical history of Anemia (1962), Cancer (HCC), Ear fullness, bilateral, Ear itching, Hearing loss, Hyperthyroidism, Osteopenia, Skin cancer, Thyroid disorder, and Tinnitus of both ears.     Surgical History: has a past surgical history that includes Appendectomy (1967) and Colonoscopy (2018).     Family History: family history includes Arthritis in her mother; Cancer in her father and mother; Esophageal cancer in her maternal grandfather; Heart disease in her father; Osteoporosis in her mother; Skin cancer (age of onset: 80) in her father; Stroke in her father.     Social History: reports that she has never smoked. She does not have any smokeless tobacco history on file. She reports current alcohol use of about 6.0 standard drinks of alcohol per week. She reports that she does not use drugs.    Allergies: Patient has no known allergies.          Current Outpatient Medications:   •  busPIRone (BUSPAR) 5 MG tablet, Take 1 tablet by mouth 3 (Three) Times a Day., Disp: 90 tablet, Rfl: 0  •  calcium carbonate (OS-YEYO) 1250 (500 Ca) MG tablet, Calcium 500 500 mg calcium (1,250 mg) oral tablet take 1 tablet by oral route daily   Active, Disp: , Rfl:   •  Cholecalciferol 25 MCG (1000 UT) capsule, Vitamin D3 1,000 unit oral capsule take 1 capsule by oral route daily   Active, Disp: , Rfl:  "  •  levothyroxine (SYNTHROID, LEVOTHROID) 125 MCG tablet, Take 1 tablet by mouth Every Morning., Disp: 90 tablet, Rfl: 1  •  triamcinolone (KENALOG) 0.1 % paste, , Disp: , Rfl:   •  escitalopram (Lexapro) 10 MG tablet, Take 1 tablet by mouth Daily., Disp: 30 tablet, Rfl: 1  •  hydrOXYzine (ATARAX) 25 MG tablet, Take 1 tablet by mouth 3 (Three) Times a Day As Needed for Anxiety., Disp: 16 tablet, Rfl: 0    There are no discontinued medications.      Review of Systems   Constitutional: Negative for fever.   Respiratory: Negative for cough and shortness of breath.    Cardiovascular: Negative for chest pain, palpitations and leg swelling.   Neurological: Negative for dizziness, weakness, numbness and headache.        Objective         Vitals:    02/11/22 1047   BP: 130/86   BP Location: Right arm   Patient Position: Sitting   Cuff Size: Adult   Pulse: 75   Resp: 16   Temp: 96.9 °F (36.1 °C)   TempSrc: Infrared   SpO2: 99%   Weight: 65.7 kg (144 lb 12.8 oz)   Height: 167.6 cm (66\")     Body mass index is 23.37 kg/m².         Physical Exam  Vitals reviewed.   Constitutional:       Appearance: Normal appearance. She is well-developed and normal weight.   HENT:      Head: Normocephalic and atraumatic.      Mouth/Throat:      Mouth: Mucous membranes are moist.      Pharynx: No oropharyngeal exudate.   Eyes:      Conjunctiva/sclera: Conjunctivae normal.      Pupils: Pupils are equal, round, and reactive to light.   Cardiovascular:      Rate and Rhythm: Normal rate and regular rhythm.      Heart sounds: Normal heart sounds. No murmur heard.  No friction rub. No gallop.    Pulmonary:      Effort: Pulmonary effort is normal.      Breath sounds: Normal breath sounds. No wheezing or rhonchi.   Abdominal:      General: Bowel sounds are normal.   Skin:     General: Skin is warm and dry.      Capillary Refill: Capillary refill takes less than 2 seconds.   Neurological:      Mental Status: She is alert and oriented to person, place, " and time.   Psychiatric:         Mood and Affect: Mood and affect normal.         Behavior: Behavior normal.         Thought Content: Thought content normal.         Judgment: Judgment normal.             Result Review :               Assessment and Plan     Diagnoses and all orders for this visit:    1. Screening for cervical cancer (Primary)  -     Cancel: IgP, Aptima HPV    2. Graves disease  -     TSH; Future    3. Postablative hypothyroidism  -     TSH; Future    4. Osteopenia, unspecified location  -     DEXA Bone Density Axial  -     Vitamin D 25 hydroxy; Future    5. Annual physical exam  -     Comprehensive metabolic panel; Future  -     Lipid Panel w/ Chol/HDL Ratio; Future  -     CBC w AUTO Differential; Future    6. Postmenopausal syndrome  -     DEXA Bone Density Axial    7. Anxiety  -     escitalopram (Lexapro) 10 MG tablet; Take 1 tablet by mouth Daily.  Dispense: 30 tablet; Refill: 1              Follow Up     Return in about 6 months (around 8/11/2022).    Patient was given instructions and counseling regarding her condition or for health maintenance advice. Please see specific information pulled into the AVS if appropriate.         Answers for HPI/ROS submitted by the patient on 2/11/2022  What is the primary reason for your visit?: Physical

## 2022-02-14 ENCOUNTER — TRANSCRIBE ORDERS (OUTPATIENT)
Dept: ADMINISTRATIVE | Facility: HOSPITAL | Age: 67
End: 2022-02-14

## 2022-02-14 ENCOUNTER — LAB (OUTPATIENT)
Dept: LAB | Facility: HOSPITAL | Age: 67
End: 2022-02-14

## 2022-02-14 DIAGNOSIS — Z00.00 ANNUAL PHYSICAL EXAM: ICD-10-CM

## 2022-02-14 DIAGNOSIS — Z12.31 VISIT FOR SCREENING MAMMOGRAM: Primary | ICD-10-CM

## 2022-02-14 DIAGNOSIS — E05.00 GRAVES DISEASE: ICD-10-CM

## 2022-02-14 DIAGNOSIS — E89.0 POSTABLATIVE HYPOTHYROIDISM: ICD-10-CM

## 2022-02-14 DIAGNOSIS — M85.80 OSTEOPENIA, UNSPECIFIED LOCATION: ICD-10-CM

## 2022-02-14 LAB
25(OH)D3 SERPL-MCNC: 36.9 NG/ML (ref 30–100)
ALBUMIN SERPL-MCNC: 4 G/DL (ref 3.5–5.2)
ALBUMIN/GLOB SERPL: 2.2 G/DL
ALP SERPL-CCNC: 48 U/L (ref 39–117)
ALT SERPL W P-5'-P-CCNC: 7 U/L (ref 1–33)
ANION GAP SERPL CALCULATED.3IONS-SCNC: 4.6 MMOL/L (ref 5–15)
AST SERPL-CCNC: 15 U/L (ref 1–32)
BASOPHILS # BLD AUTO: 0.03 10*3/MM3 (ref 0–0.2)
BASOPHILS NFR BLD AUTO: 0.7 % (ref 0–1.5)
BILIRUB SERPL-MCNC: 0.2 MG/DL (ref 0–1.2)
BUN SERPL-MCNC: 21 MG/DL (ref 8–23)
BUN/CREAT SERPL: 22.1 (ref 7–25)
CALCIUM SPEC-SCNC: 9.3 MG/DL (ref 8.6–10.5)
CHLORIDE SERPL-SCNC: 102 MMOL/L (ref 98–107)
CHOLEST SERPL-MCNC: 206 MG/DL (ref 0–200)
CO2 SERPL-SCNC: 29.4 MMOL/L (ref 22–29)
CREAT SERPL-MCNC: 0.95 MG/DL (ref 0.57–1)
DEPRECATED RDW RBC AUTO: 42.7 FL (ref 37–54)
EOSINOPHIL # BLD AUTO: 0.16 10*3/MM3 (ref 0–0.4)
EOSINOPHIL NFR BLD AUTO: 3.5 % (ref 0.3–6.2)
ERYTHROCYTE [DISTWIDTH] IN BLOOD BY AUTOMATED COUNT: 13.3 % (ref 12.3–15.4)
GFR SERPL CREATININE-BSD FRML MDRD: 59 ML/MIN/1.73
GLOBULIN UR ELPH-MCNC: 1.8 GM/DL
GLUCOSE SERPL-MCNC: 84 MG/DL (ref 65–99)
HCT VFR BLD AUTO: 38.3 % (ref 34–46.6)
HDLC SERPL QL: 3.61
HDLC SERPL-MCNC: 57 MG/DL (ref 40–60)
HGB BLD-MCNC: 13 G/DL (ref 12–15.9)
IMM GRANULOCYTES # BLD AUTO: 0.02 10*3/MM3 (ref 0–0.05)
IMM GRANULOCYTES NFR BLD AUTO: 0.4 % (ref 0–0.5)
LDLC SERPL CALC-MCNC: 134 MG/DL (ref 0–100)
LYMPHOCYTES # BLD AUTO: 1.65 10*3/MM3 (ref 0.7–3.1)
LYMPHOCYTES NFR BLD AUTO: 36 % (ref 19.6–45.3)
MCH RBC QN AUTO: 29.9 PG (ref 26.6–33)
MCHC RBC AUTO-ENTMCNC: 33.9 G/DL (ref 31.5–35.7)
MCV RBC AUTO: 88 FL (ref 79–97)
MONOCYTES # BLD AUTO: 0.37 10*3/MM3 (ref 0.1–0.9)
MONOCYTES NFR BLD AUTO: 8.1 % (ref 5–12)
NEUTROPHILS NFR BLD AUTO: 2.35 10*3/MM3 (ref 1.7–7)
NEUTROPHILS NFR BLD AUTO: 51.3 % (ref 42.7–76)
NRBC BLD AUTO-RTO: 0 /100 WBC (ref 0–0.2)
PLATELET # BLD AUTO: 197 10*3/MM3 (ref 140–450)
PMV BLD AUTO: 10.5 FL (ref 6–12)
POTASSIUM SERPL-SCNC: 4.1 MMOL/L (ref 3.5–5.2)
PROT SERPL-MCNC: 5.8 G/DL (ref 6–8.5)
RBC # BLD AUTO: 4.35 10*6/MM3 (ref 3.77–5.28)
SODIUM SERPL-SCNC: 136 MMOL/L (ref 136–145)
TRIGL SERPL-MCNC: 85 MG/DL (ref 0–150)
TSH SERPL DL<=0.05 MIU/L-ACNC: 0.72 UIU/ML (ref 0.27–4.2)
VLDLC SERPL-MCNC: 15 MG/DL (ref 5–40)
WBC NRBC COR # BLD: 4.58 10*3/MM3 (ref 3.4–10.8)

## 2022-02-14 PROCEDURE — 82306 VITAMIN D 25 HYDROXY: CPT

## 2022-02-14 PROCEDURE — 85025 COMPLETE CBC W/AUTO DIFF WBC: CPT

## 2022-02-14 PROCEDURE — 80061 LIPID PANEL: CPT

## 2022-02-14 PROCEDURE — 36415 COLL VENOUS BLD VENIPUNCTURE: CPT

## 2022-02-14 PROCEDURE — 80053 COMPREHEN METABOLIC PANEL: CPT

## 2022-02-14 PROCEDURE — 84443 ASSAY THYROID STIM HORMONE: CPT

## 2022-02-18 ENCOUNTER — APPOINTMENT (OUTPATIENT)
Dept: BONE DENSITY | Facility: HOSPITAL | Age: 67
End: 2022-02-18

## 2022-02-18 ENCOUNTER — APPOINTMENT (OUTPATIENT)
Dept: MAMMOGRAPHY | Facility: HOSPITAL | Age: 67
End: 2022-02-18

## 2022-02-18 ENCOUNTER — HOSPITAL ENCOUNTER (OUTPATIENT)
Dept: BONE DENSITY | Facility: HOSPITAL | Age: 67
Discharge: HOME | End: 2022-02-18
Admitting: NURSE PRACTITIONER

## 2022-02-18 PROCEDURE — 77080 DXA BONE DENSITY AXIAL: CPT

## 2022-02-21 ENCOUNTER — TELEPHONE (OUTPATIENT)
Dept: FAMILY MEDICINE CLINIC | Facility: CLINIC | Age: 67
End: 2022-02-21

## 2022-02-21 DIAGNOSIS — M81.0 OSTEOPOROSIS OF LUMBAR SPINE: Primary | ICD-10-CM

## 2022-02-21 RX ORDER — ALENDRONATE SODIUM 70 MG/1
70 TABLET ORAL
Qty: 13 TABLET | Refills: 1 | Status: SHIPPED | OUTPATIENT
Start: 2022-02-21 | End: 2022-09-15 | Stop reason: SDUPTHER

## 2022-02-21 NOTE — TELEPHONE ENCOUNTER
Caller: Jessie Peck    Relationship: Self    Best call back number:     What medication are you requesting: FOSAMAX    PATIENT REPORTS GETTING A MESSAGE FROM  Percy Duke APRN, AND PATIENT WOULD LIKE TO START RECOMMENDED MEDICATION    If a prescription is needed, what is your preferred pharmacy and phone number: [facility] [place] [facility] - [place], [place] - [address] - [phone]  - [phone]      Additional notes:  PATIENT REQUESTS CALL BACK  WHEN MEDICATION HAS BEEN SENT.

## 2022-02-23 ENCOUNTER — TELEPHONE (OUTPATIENT)
Dept: FAMILY MEDICINE CLINIC | Facility: CLINIC | Age: 67
End: 2022-02-23

## 2022-02-23 NOTE — TELEPHONE ENCOUNTER
Caller: Jessie Peck    Relationship to patient: Self    Best call back number: [phone]    Patient is needing: PATIENT CALLED STATING SHE HAS HER 2016 AND 2018 BONE DENSITY RESULTS FROM [place] AND SHE WOULD LIKE TO KNOW IF HER PCP WOULD LIKE TO HAVE THEM ON FILE. THE PATIENT WOULD LIKE A CALL BACK PLEASE ADVISE THANK YOU.

## 2022-03-11 ENCOUNTER — HOSPITAL ENCOUNTER (OUTPATIENT)
Dept: MAMMOGRAPHY | Facility: HOSPITAL | Age: 67
Discharge: HOME | End: 2022-03-11
Admitting: NURSE PRACTITIONER

## 2022-03-11 DIAGNOSIS — Z12.31 VISIT FOR SCREENING MAMMOGRAM: ICD-10-CM

## 2022-03-11 PROCEDURE — 77063 BREAST TOMOSYNTHESIS BI: CPT

## 2022-03-11 PROCEDURE — 77067 SCR MAMMO BI INCL CAD: CPT

## 2022-03-23 DIAGNOSIS — Z12.31 ENCOUNTER FOR SCREENING MAMMOGRAM FOR MALIGNANT NEOPLASM OF BREAST: Primary | ICD-10-CM

## 2022-04-12 DIAGNOSIS — F41.9 ANXIETY: ICD-10-CM

## 2022-04-12 DIAGNOSIS — E03.9 HYPOTHYROIDISM, UNSPECIFIED TYPE: ICD-10-CM

## 2022-04-12 RX ORDER — LEVOTHYROXINE SODIUM 0.12 MG/1
125 TABLET ORAL
Qty: 90 TABLET | Refills: 1 | Status: SHIPPED | OUTPATIENT
Start: 2022-04-12 | End: 2022-09-15 | Stop reason: SDUPTHER

## 2022-04-12 RX ORDER — ESCITALOPRAM OXALATE 10 MG/1
10 TABLET ORAL DAILY
Qty: 90 TABLET | Refills: 1 | Status: SHIPPED | OUTPATIENT
Start: 2022-04-12 | End: 2022-07-13

## 2022-07-13 ENCOUNTER — OFFICE VISIT (OUTPATIENT)
Dept: FAMILY MEDICINE CLINIC | Facility: CLINIC | Age: 67
End: 2022-07-13

## 2022-07-13 VITALS
HEART RATE: 63 BPM | DIASTOLIC BLOOD PRESSURE: 76 MMHG | RESPIRATION RATE: 16 BRPM | OXYGEN SATURATION: 99 % | SYSTOLIC BLOOD PRESSURE: 150 MMHG | WEIGHT: 145.6 LBS | TEMPERATURE: 96 F | BODY MASS INDEX: 23.4 KG/M2 | HEIGHT: 66 IN

## 2022-07-13 DIAGNOSIS — E78.00 ELEVATED CHOLESTEROL: ICD-10-CM

## 2022-07-13 DIAGNOSIS — H25.019 CORTICAL AGE-RELATED CATARACT, UNSPECIFIED LATERALITY: ICD-10-CM

## 2022-07-13 DIAGNOSIS — R53.83 FATIGUE, UNSPECIFIED TYPE: ICD-10-CM

## 2022-07-13 DIAGNOSIS — L65.9 HAIR LOSS: ICD-10-CM

## 2022-07-13 DIAGNOSIS — E07.9 THYROID DISORDER: ICD-10-CM

## 2022-07-13 DIAGNOSIS — F41.9 ANXIETY: ICD-10-CM

## 2022-07-13 DIAGNOSIS — K59.00 CONSTIPATION, UNSPECIFIED CONSTIPATION TYPE: ICD-10-CM

## 2022-07-13 DIAGNOSIS — Z23 NEED FOR VACCINATION AGAINST STREPTOCOCCUS PNEUMONIAE: Primary | ICD-10-CM

## 2022-07-13 PROCEDURE — 90677 PCV20 VACCINE IM: CPT | Performed by: NURSE PRACTITIONER

## 2022-07-13 PROCEDURE — G0009 ADMIN PNEUMOCOCCAL VACCINE: HCPCS | Performed by: NURSE PRACTITIONER

## 2022-07-13 PROCEDURE — 99214 OFFICE O/P EST MOD 30 MIN: CPT | Performed by: NURSE PRACTITIONER

## 2022-07-13 RX ORDER — HYDROXYZINE HYDROCHLORIDE 25 MG/1
25 TABLET, FILM COATED ORAL 3 TIMES DAILY PRN
Qty: 30 TABLET | Refills: 2 | Status: SHIPPED | OUTPATIENT
Start: 2022-07-13

## 2022-07-13 NOTE — PROGRESS NOTES
Chief Complaint  Alopecia and abnormal stool    Subjective        Jessie Peck presents to [facility]  Alopecia:  Has noticed in the last 3 months.      Having abnormal stool.  Has noticed a change in bowel movements.  Have been more round stool and dark in color.  States is drinking more. Will have a small one come out occasionally.  Has noticed since starting lexapro in the Spring.    Has been sleeping better.  Has been fatigued.  Notes has had a stressful year with children and health issues.    Blood pressure up a little today.        Past History:    Medical History: has a past medical history of Anemia (1962), Cancer (HCC), Ear fullness, bilateral, Ear itching, Hearing loss, Hyperthyroidism, Osteopenia, Skin cancer, Thyroid disorder, and Tinnitus of both ears.     Surgical History: has a past surgical history that includes Appendectomy (1967) and Colonoscopy (2018).     Family History: family history includes Arthritis in her mother; Cancer in her father and mother; Esophageal cancer in her maternal grandfather; Heart disease in her father; Osteoporosis in her mother; Skin cancer (age of onset: 80) in her father; Stroke in her father.     Social History: reports that she has never smoked. She does not have any smokeless tobacco history on file. She reports current alcohol use of about 6.0 standard drinks of alcohol per week. She reports that she does not use drugs.    Allergies: Patient has no known allergies.          Current Outpatient Medications:   •  alendronate (Fosamax) 70 MG tablet, Take 1 tablet by mouth Every 7 (Seven) Days., Disp: 13 tablet, Rfl: 1  •  calcium carbonate (OS-YEYO) 1250 (500 Ca) MG tablet, Calcium 500 500 mg calcium (1,250 mg) oral tablet take 1 tablet by oral route daily   Active, Disp: , Rfl:   •  hydrOXYzine (ATARAX) 25 MG tablet, Take 1 tablet by mouth 3 (Three) Times a Day As Needed for Anxiety., Disp: 30 tablet, Rfl: 2  •  levothyroxine  "(SYNTHROID, LEVOTHROID) 125 MCG tablet, Take 1 tablet by mouth Every Morning., Disp: 90 tablet, Rfl: 1  •  Cholecalciferol 25 MCG (1000 UT) capsule, Vitamin D3 1,000 unit oral capsule take 1 capsule by oral route daily   Active, Disp: , Rfl:   •  triamcinolone (KENALOG) 0.1 % paste, , Disp: , Rfl:     Medications Discontinued During This Encounter   Medication Reason   • busPIRone (BUSPAR) 5 MG tablet *Therapy completed   • hydrOXYzine (ATARAX) 25 MG tablet Reorder   • escitalopram (Lexapro) 10 MG tablet *Therapy completed         Review of Systems   Constitutional: Negative for fever.   Respiratory: Negative for cough and shortness of breath.    Cardiovascular: Negative for chest pain, palpitations and leg swelling.   Neurological: Negative for dizziness, weakness, numbness and headache.        Objective         Vitals:    07/13/22 0826   BP: 150/86   BP Location: Right arm   Patient Position: Sitting   Cuff Size: Adult   Pulse: 63   Resp: 16   Temp: 96 °F (35.6 °C)   TempSrc: Infrared   SpO2: 99%   Weight: 66 kg (145 lb 9.6 oz)   Height: 167.6 cm (65.98\")     Body mass index is 23.51 kg/m².         Physical Exam  Vitals reviewed.   Constitutional:       Appearance: Normal appearance. She is well-developed.   HENT:      Head: Normocephalic and atraumatic.      Mouth/Throat:      Pharynx: No oropharyngeal exudate.   Eyes:      Conjunctiva/sclera: Conjunctivae normal.      Pupils: Pupils are equal, round, and reactive to light.   Cardiovascular:      Rate and Rhythm: Normal rate and regular rhythm.      Heart sounds: Normal heart sounds. No murmur heard.    No friction rub. No gallop.   Pulmonary:      Effort: Pulmonary effort is normal.      Breath sounds: Normal breath sounds. No wheezing or rhonchi.   Skin:     General: Skin is warm and dry.   Neurological:      Mental Status: She is alert and oriented to person, place, and time.   Psychiatric:         Mood and Affect: Mood and affect normal.         Behavior: " Behavior normal.         Thought Content: Thought content normal.         Judgment: Judgment normal.             Result Review :               Assessment and Plan     Diagnoses and all orders for this visit:    1. Need for vaccination against Streptococcus pneumoniae (Primary)  -     Pneumococcal Conjugate Vaccine 20-Valent (PCV20)    2. Anxiety  -     hydrOXYzine (ATARAX) 25 MG tablet; Take 1 tablet by mouth 3 (Three) Times a Day As Needed for Anxiety.  Dispense: 30 tablet; Refill: 2    3. Constipation, unspecified constipation type  Comments:  may recover after stopping  lexapro.  Orders:  -     Comprehensive metabolic panel; Future    4. Thyroid disorder  -     TSH; Future    5. Fatigue, unspecified type  -     Folate; Future  -     Vitamin B12; Future  -     CBC w AUTO Differential; Future    6. Hair loss  Comments:  will see if stopping lexapro corrects.      7. Elevated cholesterol  -     Lipid Panel With LDL/HDL Ratio; Future              Follow Up     Return in about 6 months (around 1/13/2023).    Patient was given instructions and counseling regarding her condition or for health maintenance advice. Please see specific information pulled into the AVS if appropriate.

## 2022-07-14 ENCOUNTER — LAB (OUTPATIENT)
Dept: LAB | Facility: HOSPITAL | Age: 67
End: 2022-07-14

## 2022-07-14 DIAGNOSIS — E78.00 ELEVATED CHOLESTEROL: ICD-10-CM

## 2022-07-14 DIAGNOSIS — R53.83 FATIGUE, UNSPECIFIED TYPE: ICD-10-CM

## 2022-07-14 DIAGNOSIS — K59.00 CONSTIPATION, UNSPECIFIED CONSTIPATION TYPE: ICD-10-CM

## 2022-07-14 DIAGNOSIS — E07.9 THYROID DISORDER: ICD-10-CM

## 2022-07-14 LAB
ALBUMIN SERPL-MCNC: 4.4 G/DL (ref 3.5–5.2)
ALBUMIN/GLOB SERPL: 2 G/DL
ALP SERPL-CCNC: 54 U/L (ref 39–117)
ALT SERPL W P-5'-P-CCNC: 7 U/L (ref 1–33)
ANION GAP SERPL CALCULATED.3IONS-SCNC: 10.6 MMOL/L (ref 5–15)
AST SERPL-CCNC: 13 U/L (ref 1–32)
BASOPHILS # BLD AUTO: 0.01 10*3/MM3 (ref 0–0.2)
BASOPHILS NFR BLD AUTO: 0.2 % (ref 0–1.5)
BILIRUB SERPL-MCNC: 0.8 MG/DL (ref 0–1.2)
BUN SERPL-MCNC: 14 MG/DL (ref 8–23)
BUN/CREAT SERPL: 15.1 (ref 7–25)
CALCIUM SPEC-SCNC: 9.4 MG/DL (ref 8.6–10.5)
CHLORIDE SERPL-SCNC: 104 MMOL/L (ref 98–107)
CHOLEST SERPL-MCNC: 224 MG/DL (ref 0–200)
CO2 SERPL-SCNC: 27.4 MMOL/L (ref 22–29)
CREAT SERPL-MCNC: 0.93 MG/DL (ref 0.57–1)
DEPRECATED RDW RBC AUTO: 43.1 FL (ref 37–54)
EGFRCR SERPLBLD CKD-EPI 2021: 68.3 ML/MIN/1.73
EOSINOPHIL # BLD AUTO: 0.19 10*3/MM3 (ref 0–0.4)
EOSINOPHIL NFR BLD AUTO: 3.8 % (ref 0.3–6.2)
ERYTHROCYTE [DISTWIDTH] IN BLOOD BY AUTOMATED COUNT: 13.2 % (ref 12.3–15.4)
FOLATE SERPL-MCNC: 6.17 NG/ML (ref 4.78–24.2)
GLOBULIN UR ELPH-MCNC: 2.2 GM/DL
GLUCOSE SERPL-MCNC: 94 MG/DL (ref 65–99)
HCT VFR BLD AUTO: 40.7 % (ref 34–46.6)
HDLC SERPL-MCNC: 69 MG/DL (ref 40–60)
HGB BLD-MCNC: 13.8 G/DL (ref 12–15.9)
IMM GRANULOCYTES # BLD AUTO: 0.02 10*3/MM3 (ref 0–0.05)
IMM GRANULOCYTES NFR BLD AUTO: 0.4 % (ref 0–0.5)
LDLC SERPL CALC-MCNC: 135 MG/DL (ref 0–100)
LDLC/HDLC SERPL: 1.92 {RATIO}
LYMPHOCYTES # BLD AUTO: 0.54 10*3/MM3 (ref 0.7–3.1)
LYMPHOCYTES NFR BLD AUTO: 10.8 % (ref 19.6–45.3)
MCH RBC QN AUTO: 30.5 PG (ref 26.6–33)
MCHC RBC AUTO-ENTMCNC: 33.9 G/DL (ref 31.5–35.7)
MCV RBC AUTO: 89.8 FL (ref 79–97)
MONOCYTES # BLD AUTO: 0.33 10*3/MM3 (ref 0.1–0.9)
MONOCYTES NFR BLD AUTO: 6.6 % (ref 5–12)
NEUTROPHILS NFR BLD AUTO: 3.92 10*3/MM3 (ref 1.7–7)
NEUTROPHILS NFR BLD AUTO: 78.2 % (ref 42.7–76)
NRBC BLD AUTO-RTO: 0 /100 WBC (ref 0–0.2)
PLATELET # BLD AUTO: 175 10*3/MM3 (ref 140–450)
PMV BLD AUTO: 10.2 FL (ref 6–12)
POTASSIUM SERPL-SCNC: 4.3 MMOL/L (ref 3.5–5.2)
PROT SERPL-MCNC: 6.6 G/DL (ref 6–8.5)
RBC # BLD AUTO: 4.53 10*6/MM3 (ref 3.77–5.28)
SODIUM SERPL-SCNC: 142 MMOL/L (ref 136–145)
TRIGL SERPL-MCNC: 112 MG/DL (ref 0–150)
TSH SERPL DL<=0.05 MIU/L-ACNC: 1.05 UIU/ML (ref 0.27–4.2)
VIT B12 BLD-MCNC: 322 PG/ML (ref 211–946)
VLDLC SERPL-MCNC: 20 MG/DL (ref 5–40)
WBC NRBC COR # BLD: 5.01 10*3/MM3 (ref 3.4–10.8)

## 2022-07-14 PROCEDURE — 82607 VITAMIN B-12: CPT

## 2022-07-14 PROCEDURE — 80053 COMPREHEN METABOLIC PANEL: CPT

## 2022-07-14 PROCEDURE — 36415 COLL VENOUS BLD VENIPUNCTURE: CPT

## 2022-07-14 PROCEDURE — 82746 ASSAY OF FOLIC ACID SERUM: CPT

## 2022-07-14 PROCEDURE — 80061 LIPID PANEL: CPT

## 2022-07-14 PROCEDURE — 84443 ASSAY THYROID STIM HORMONE: CPT

## 2022-07-14 PROCEDURE — 85025 COMPLETE CBC W/AUTO DIFF WBC: CPT

## 2022-09-15 ENCOUNTER — TELEPHONE (OUTPATIENT)
Dept: FAMILY MEDICINE CLINIC | Facility: CLINIC | Age: 67
End: 2022-09-15

## 2022-09-15 DIAGNOSIS — M81.0 OSTEOPOROSIS OF LUMBAR SPINE: ICD-10-CM

## 2022-09-15 DIAGNOSIS — E03.9 HYPOTHYROIDISM, UNSPECIFIED TYPE: ICD-10-CM

## 2022-09-15 RX ORDER — ALENDRONATE SODIUM 70 MG/1
70 TABLET ORAL
Qty: 13 TABLET | Refills: 1 | Status: SHIPPED | OUTPATIENT
Start: 2022-09-15 | End: 2022-09-25 | Stop reason: SDUPTHER

## 2022-09-15 RX ORDER — LEVOTHYROXINE SODIUM 0.12 MG/1
125 TABLET ORAL
Qty: 90 TABLET | Refills: 1 | Status: SHIPPED | OUTPATIENT
Start: 2022-09-15 | End: 2022-09-25 | Stop reason: SDUPTHER

## 2022-09-15 NOTE — TELEPHONE ENCOUNTER
Caller: Cisco Adriánmary    Relationship: Self    Best call back number: [phone]    Requested Prescriptions:   Requested Prescriptions     Pending Prescriptions Disp Refills   • alendronate (Fosamax) 70 MG tablet 13 tablet 1     Sig: Take 1 tablet by mouth Every 7 (Seven) Days.   • levothyroxine (SYNTHROID, LEVOTHROID) 125 MCG tablet 90 tablet 1     Sig: Take 1 tablet by mouth Every Morning.        Pharmacy where request should be sent: [facility] [facility] -  [place], [place] - [address] [phone] [facility] [phone] FX       Does the patient have less than a 3 day supply:  [] Yes  [x] No    Jose R Quinn Rep   09/15/22 09:07 EDT

## 2022-09-25 DIAGNOSIS — E03.9 HYPOTHYROIDISM, UNSPECIFIED TYPE: ICD-10-CM

## 2022-09-25 DIAGNOSIS — M81.0 OSTEOPOROSIS OF LUMBAR SPINE: ICD-10-CM

## 2022-09-26 RX ORDER — LEVOTHYROXINE SODIUM 0.12 MG/1
125 TABLET ORAL
Qty: 90 TABLET | Refills: 1 | Status: SHIPPED | OUTPATIENT
Start: 2022-09-26 | End: 2022-11-10 | Stop reason: SDUPTHER

## 2022-09-26 RX ORDER — ALENDRONATE SODIUM 70 MG/1
70 TABLET ORAL
Qty: 13 TABLET | Refills: 1 | Status: SHIPPED | OUTPATIENT
Start: 2022-09-26 | End: 2023-01-13

## 2022-10-12 ENCOUNTER — TELEPHONE (OUTPATIENT)
Dept: FAMILY MEDICINE CLINIC | Facility: CLINIC | Age: 67
End: 2022-10-12

## 2022-10-12 DIAGNOSIS — R79.89 ABNORMAL CBC: Primary | ICD-10-CM

## 2022-10-12 NOTE — TELEPHONE ENCOUNTER
Caller: Jessie Peck    Relationship: Self    Best call back number: [phone]    What is the best time to reach you:  ANY      Who are you requesting to speak with (clinical staff, provider,  specific staff member): CLINICAL    What was the call regarding: PATIENT STATED THAT SHE HAD A LAB RESULT THAT CONCERNED HER DUE TO THE PLANTLET AND LYMPHOCYTE LEVELS AND WANTING TO REPEAT THE LABS AS SO AS POSSIBLE.     Do you require a callback: YES

## 2022-10-13 ENCOUNTER — LAB (OUTPATIENT)
Dept: LAB | Facility: HOSPITAL | Age: 67
End: 2022-10-13

## 2022-10-13 DIAGNOSIS — R79.89 ABNORMAL CBC: ICD-10-CM

## 2022-10-13 LAB
BASOPHILS # BLD AUTO: 0.03 10*3/MM3 (ref 0–0.2)
BASOPHILS NFR BLD AUTO: 0.6 % (ref 0–1.5)
DEPRECATED RDW RBC AUTO: 41 FL (ref 37–54)
EOSINOPHIL # BLD AUTO: 0.2 10*3/MM3 (ref 0–0.4)
EOSINOPHIL NFR BLD AUTO: 4.1 % (ref 0.3–6.2)
ERYTHROCYTE [DISTWIDTH] IN BLOOD BY AUTOMATED COUNT: 12.4 % (ref 12.3–15.4)
HCT VFR BLD AUTO: 40.7 % (ref 34–46.6)
HGB BLD-MCNC: 13.8 G/DL (ref 12–15.9)
IMM GRANULOCYTES # BLD AUTO: 0.02 10*3/MM3 (ref 0–0.05)
IMM GRANULOCYTES NFR BLD AUTO: 0.4 % (ref 0–0.5)
LYMPHOCYTES # BLD AUTO: 1.49 10*3/MM3 (ref 0.7–3.1)
LYMPHOCYTES NFR BLD AUTO: 30.7 % (ref 19.6–45.3)
MCH RBC QN AUTO: 30.7 PG (ref 26.6–33)
MCHC RBC AUTO-ENTMCNC: 33.9 G/DL (ref 31.5–35.7)
MCV RBC AUTO: 90.4 FL (ref 79–97)
MONOCYTES # BLD AUTO: 0.42 10*3/MM3 (ref 0.1–0.9)
MONOCYTES NFR BLD AUTO: 8.7 % (ref 5–12)
NEUTROPHILS NFR BLD AUTO: 2.69 10*3/MM3 (ref 1.7–7)
NEUTROPHILS NFR BLD AUTO: 55.5 % (ref 42.7–76)
NRBC BLD AUTO-RTO: 0 /100 WBC (ref 0–0.2)
PLATELET # BLD AUTO: 237 10*3/MM3 (ref 140–450)
PMV BLD AUTO: 10 FL (ref 6–12)
RBC # BLD AUTO: 4.5 10*6/MM3 (ref 3.77–5.28)
WBC NRBC COR # BLD: 4.85 10*3/MM3 (ref 3.4–10.8)

## 2022-10-13 PROCEDURE — 85025 COMPLETE CBC W/AUTO DIFF WBC: CPT

## 2022-10-13 PROCEDURE — 36415 COLL VENOUS BLD VENIPUNCTURE: CPT

## 2022-11-10 ENCOUNTER — TELEPHONE (OUTPATIENT)
Dept: FAMILY MEDICINE CLINIC | Facility: CLINIC | Age: 67
End: 2022-11-10

## 2022-11-10 DIAGNOSIS — E03.9 HYPOTHYROIDISM, UNSPECIFIED TYPE: ICD-10-CM

## 2022-11-10 RX ORDER — LEVOTHYROXINE SODIUM 0.12 MG/1
125 TABLET ORAL
Qty: 90 TABLET | Refills: 0 | Status: SHIPPED | OUTPATIENT
Start: 2022-11-10 | End: 2023-01-13 | Stop reason: SDUPTHER

## 2022-11-10 NOTE — TELEPHONE ENCOUNTER
Caller: Jessie Peck    Relationship: Self    Best call back number: [phone]    Requested Prescriptions:   Requested Prescriptions     Pending Prescriptions Disp Refills   • levothyroxine (SYNTHROID, LEVOTHROID) 125 MCG tablet 90 tablet 1     Sig: Take 1 tablet by mouth Every Morning.        Pharmacy where request should be sent: [facility] [place] [facility] -  [place], [place] - [address] - [phone] [facility] [phone] FX         Does the patient have less than a 3 day supply:  [x] Yes  [] No    Jose R Quinn Rep   11/10/22 09:12 EST

## 2023-01-11 ENCOUNTER — TELEPHONE (OUTPATIENT)
Dept: FAMILY MEDICINE CLINIC | Facility: CLINIC | Age: 68
End: 2023-01-11
Payer: MEDICARE

## 2023-01-11 DIAGNOSIS — E55.9 VITAMIN D DEFICIENCY: ICD-10-CM

## 2023-01-11 DIAGNOSIS — E78.00 ELEVATED CHOLESTEROL: ICD-10-CM

## 2023-01-11 DIAGNOSIS — E03.9 HYPOTHYROIDISM, UNSPECIFIED TYPE: Primary | ICD-10-CM

## 2023-01-11 DIAGNOSIS — E05.00 GRAVES DISEASE: ICD-10-CM

## 2023-01-11 NOTE — TELEPHONE ENCOUNTER
Patient wants to know if she needs labs done before her appt on Friday 1/13/23. She also wants to know if that appt is suppose to be her yearly. Patient request call back

## 2023-01-11 NOTE — TELEPHONE ENCOUNTER
Called patient to let her know that labs will be put in today and she can get them done tomorrow. Let her know her next yearly will be anytime after 2/12/23. Patient verbalized understanding

## 2023-01-13 ENCOUNTER — LAB (OUTPATIENT)
Dept: LAB | Facility: HOSPITAL | Age: 68
End: 2023-01-13
Payer: MEDICARE

## 2023-01-13 ENCOUNTER — OFFICE VISIT (OUTPATIENT)
Dept: FAMILY MEDICINE CLINIC | Facility: CLINIC | Age: 68
End: 2023-01-13
Payer: MEDICARE

## 2023-01-13 VITALS
BODY MASS INDEX: 24.04 KG/M2 | SYSTOLIC BLOOD PRESSURE: 138 MMHG | OXYGEN SATURATION: 99 % | DIASTOLIC BLOOD PRESSURE: 80 MMHG | RESPIRATION RATE: 16 BRPM | WEIGHT: 149.6 LBS | TEMPERATURE: 98.2 F | HEIGHT: 66 IN | HEART RATE: 82 BPM

## 2023-01-13 DIAGNOSIS — E05.00 GRAVES DISEASE: ICD-10-CM

## 2023-01-13 DIAGNOSIS — E78.00 ELEVATED CHOLESTEROL: ICD-10-CM

## 2023-01-13 DIAGNOSIS — E03.9 HYPOTHYROIDISM, UNSPECIFIED TYPE: ICD-10-CM

## 2023-01-13 DIAGNOSIS — M81.0 OSTEOPOROSIS OF LUMBAR SPINE: Primary | ICD-10-CM

## 2023-01-13 DIAGNOSIS — E55.9 VITAMIN D DEFICIENCY: ICD-10-CM

## 2023-01-13 LAB
25(OH)D3 SERPL-MCNC: 38.4 NG/ML (ref 30–100)
ALBUMIN SERPL-MCNC: 4.7 G/DL (ref 3.5–5.2)
ALBUMIN/GLOB SERPL: 2.2 G/DL
ALP SERPL-CCNC: 52 U/L (ref 39–117)
ALT SERPL W P-5'-P-CCNC: 9 U/L (ref 1–33)
ANION GAP SERPL CALCULATED.3IONS-SCNC: 7.2 MMOL/L (ref 5–15)
AST SERPL-CCNC: 16 U/L (ref 1–32)
BASOPHILS # BLD AUTO: 0.04 10*3/MM3 (ref 0–0.2)
BASOPHILS NFR BLD AUTO: 0.8 % (ref 0–1.5)
BILIRUB SERPL-MCNC: 0.5 MG/DL (ref 0–1.2)
BUN SERPL-MCNC: 11 MG/DL (ref 8–23)
BUN/CREAT SERPL: 13.4 (ref 7–25)
CALCIUM SPEC-SCNC: 9.3 MG/DL (ref 8.6–10.5)
CHLORIDE SERPL-SCNC: 101 MMOL/L (ref 98–107)
CHOLEST SERPL-MCNC: 238 MG/DL (ref 0–200)
CO2 SERPL-SCNC: 28.8 MMOL/L (ref 22–29)
CREAT SERPL-MCNC: 0.82 MG/DL (ref 0.57–1)
DEPRECATED RDW RBC AUTO: 43 FL (ref 37–54)
EGFRCR SERPLBLD CKD-EPI 2021: 79.5 ML/MIN/1.73
EOSINOPHIL # BLD AUTO: 0.15 10*3/MM3 (ref 0–0.4)
EOSINOPHIL NFR BLD AUTO: 3 % (ref 0.3–6.2)
ERYTHROCYTE [DISTWIDTH] IN BLOOD BY AUTOMATED COUNT: 12.9 % (ref 12.3–15.4)
GLOBULIN UR ELPH-MCNC: 2.1 GM/DL
GLUCOSE SERPL-MCNC: 98 MG/DL (ref 65–99)
HCT VFR BLD AUTO: 42.6 % (ref 34–46.6)
HDLC SERPL-MCNC: 64 MG/DL (ref 40–60)
HGB BLD-MCNC: 14.2 G/DL (ref 12–15.9)
IMM GRANULOCYTES # BLD AUTO: 0.03 10*3/MM3 (ref 0–0.05)
IMM GRANULOCYTES NFR BLD AUTO: 0.6 % (ref 0–0.5)
LDLC SERPL CALC-MCNC: 150 MG/DL (ref 0–100)
LDLC/HDLC SERPL: 2.29 {RATIO}
LYMPHOCYTES # BLD AUTO: 1.29 10*3/MM3 (ref 0.7–3.1)
LYMPHOCYTES NFR BLD AUTO: 25.9 % (ref 19.6–45.3)
MCH RBC QN AUTO: 30.4 PG (ref 26.6–33)
MCHC RBC AUTO-ENTMCNC: 33.3 G/DL (ref 31.5–35.7)
MCV RBC AUTO: 91.2 FL (ref 79–97)
MONOCYTES # BLD AUTO: 0.34 10*3/MM3 (ref 0.1–0.9)
MONOCYTES NFR BLD AUTO: 6.8 % (ref 5–12)
NEUTROPHILS NFR BLD AUTO: 3.14 10*3/MM3 (ref 1.7–7)
NEUTROPHILS NFR BLD AUTO: 62.9 % (ref 42.7–76)
NRBC BLD AUTO-RTO: 0 /100 WBC (ref 0–0.2)
PLATELET # BLD AUTO: 231 10*3/MM3 (ref 140–450)
PMV BLD AUTO: 9.9 FL (ref 6–12)
POTASSIUM SERPL-SCNC: 4.2 MMOL/L (ref 3.5–5.2)
PROT SERPL-MCNC: 6.8 G/DL (ref 6–8.5)
RBC # BLD AUTO: 4.67 10*6/MM3 (ref 3.77–5.28)
SODIUM SERPL-SCNC: 137 MMOL/L (ref 136–145)
TRIGL SERPL-MCNC: 137 MG/DL (ref 0–150)
TSH SERPL DL<=0.05 MIU/L-ACNC: 1.68 UIU/ML (ref 0.27–4.2)
VLDLC SERPL-MCNC: 24 MG/DL (ref 5–40)
WBC NRBC COR # BLD: 4.99 10*3/MM3 (ref 3.4–10.8)

## 2023-01-13 PROCEDURE — 82306 VITAMIN D 25 HYDROXY: CPT

## 2023-01-13 PROCEDURE — 80061 LIPID PANEL: CPT

## 2023-01-13 PROCEDURE — 84443 ASSAY THYROID STIM HORMONE: CPT

## 2023-01-13 PROCEDURE — 99214 OFFICE O/P EST MOD 30 MIN: CPT | Performed by: NURSE PRACTITIONER

## 2023-01-13 PROCEDURE — 36415 COLL VENOUS BLD VENIPUNCTURE: CPT

## 2023-01-13 PROCEDURE — 85025 COMPLETE CBC W/AUTO DIFF WBC: CPT

## 2023-01-13 PROCEDURE — 80053 COMPREHEN METABOLIC PANEL: CPT

## 2023-01-13 RX ORDER — LEVOTHYROXINE SODIUM 0.12 MG/1
125 TABLET ORAL
Qty: 90 TABLET | Refills: 1 | Status: SHIPPED | OUTPATIENT
Start: 2023-01-13

## 2023-01-13 RX ORDER — ESCITALOPRAM OXALATE 10 MG/1
10 TABLET ORAL DAILY
COMMUNITY
End: 2023-03-27 | Stop reason: SDUPTHER

## 2023-01-13 RX ORDER — RALOXIFENE HYDROCHLORIDE 60 MG/1
60 TABLET, FILM COATED ORAL DAILY
Qty: 90 TABLET | Refills: 1 | Status: SHIPPED | OUTPATIENT
Start: 2023-01-13

## 2023-01-13 NOTE — PROGRESS NOTES
Answers for HPI/ROS submitted by the patient on 1/6/2023  What is the primary reason for your visit?: Physical    Chief Complaint  Leg Pain (Right - fell while hiking has a sore spot still), Osteoporosis (Wants to discuss fosamax), and Hypothyroidism    Subjective        Jessie Peck presents to [facility]  History of Present Illness  Osteoporosis:  Would like to to discuss fosamax.    Hypothyroidism:  Doing well on medication and got labs.    Leg pain where she fell while hiking and slipped ice in the last few weeks.          Leg Pain   Pertinent negatives include no numbness.   Osteoporosis  Pertinent negatives include no chest pain, coughing, fever, numbness or weakness.   Hypothyroidism  Pertinent negatives include no chest pain, coughing, fever, numbness or weakness.         Past History:    Medical History: has a past medical history of Anemia (1962), Cancer (HCC), Cataract, Ear fullness, bilateral, Ear itching, Hearing loss, Hyperthyroidism, Hypothyroidism (2000??), Osteopenia, Skin cancer, Thyroid disorder, and Tinnitus of both ears.     Surgical History: has a past surgical history that includes Appendectomy (1967); Colonoscopy (2018); and Eye surgery (Nov 2022).     Family History: family history includes Arthritis in her mother; Cancer in her father and mother; Esophageal cancer in her maternal grandfather; Heart disease in her father; Osteoporosis in her mother; Skin cancer (age of onset: 80) in her father; Stroke in her father.     Social History: reports that she has never smoked. She has never used smokeless tobacco. She reports current alcohol use of about 4.0 - 6.0 standard drinks per week. She reports that she does not use drugs.    Allergies: Patient has no known allergies.          Current Outpatient Medications:   •  calcium carbonate (OS-YEYO) 1250 (500 Ca) MG tablet, Calcium 500 500 mg calcium (1,250 mg) oral tablet take 1 tablet by oral route daily   Active,  "Disp: , Rfl:   •  hydrOXYzine (ATARAX) 25 MG tablet, Take 1 tablet by mouth 3 (Three) Times a Day As Needed for Anxiety., Disp: 30 tablet, Rfl: 2  •  levothyroxine (SYNTHROID, LEVOTHROID) 125 MCG tablet, Take 1 tablet by mouth Every Morning., Disp: 90 tablet, Rfl: 1  •  Cholecalciferol 25 MCG (1000 UT) capsule, Vitamin D3 1,000 unit oral capsule take 1 capsule by oral route daily   Active, Disp: , Rfl:   •  escitalopram (LEXAPRO) 10 MG tablet, Take 10 mg by mouth Daily., Disp: , Rfl:   •  raloxifene (EVISTA) 60 MG tablet, Take 1 tablet by mouth Daily., Disp: 90 tablet, Rfl: 1    Medications Discontinued During This Encounter   Medication Reason   • triamcinolone (KENALOG) 0.1 % paste *Therapy completed   • alendronate (Fosamax) 70 MG tablet Other- See Medication Note   • levothyroxine (SYNTHROID, LEVOTHROID) 125 MCG tablet Reorder         Review of Systems   Constitutional: Negative for fever.   Respiratory: Negative for cough and shortness of breath.    Cardiovascular: Negative for chest pain, palpitations and leg swelling.   Neurological: Negative for dizziness, weakness, numbness and headache.        Objective         Vitals:    01/13/23 1324   BP: 138/80   BP Location: Left arm   Patient Position: Sitting   Cuff Size: Adult   Pulse: 82   Resp: 16   Temp: 98.2 °F (36.8 °C)   TempSrc: Temporal   SpO2: 99%   Weight: 67.9 kg (149 lb 9.6 oz)   Height: 167.6 cm (65.98\")     Body mass index is 24.16 kg/m².         Physical Exam  Vitals reviewed.   Constitutional:       Appearance: Normal appearance. She is well-developed.   HENT:      Head: Normocephalic and atraumatic.      Mouth/Throat:      Pharynx: No oropharyngeal exudate.   Eyes:      Conjunctiva/sclera: Conjunctivae normal.      Pupils: Pupils are equal, round, and reactive to light.   Cardiovascular:      Rate and Rhythm: Normal rate and regular rhythm.      Heart sounds: Normal heart sounds. No murmur heard.    No friction rub. No gallop.   Pulmonary:      " Effort: Pulmonary effort is normal.      Breath sounds: Normal breath sounds. No wheezing or rhonchi.   Skin:     General: Skin is warm and dry.   Neurological:      Mental Status: She is alert and oriented to person, place, and time.   Psychiatric:         Mood and Affect: Mood and affect normal.         Behavior: Behavior normal.         Thought Content: Thought content normal.         Judgment: Judgment normal.             Result Review :               Assessment and Plan     Diagnoses and all orders for this visit:    1. Osteoporosis of lumbar spine (Primary)  -     raloxifene (EVISTA) 60 MG tablet; Take 1 tablet by mouth Daily.  Dispense: 90 tablet; Refill: 1    2. Hypothyroidism, unspecified type  -     levothyroxine (SYNTHROID, LEVOTHROID) 125 MCG tablet; Take 1 tablet by mouth Every Morning.  Dispense: 90 tablet; Refill: 1  -     TSH; Future    3. Elevated cholesterol  -     Comprehensive metabolic panel; Future  -     Lipid Panel w/ Chol/HDL Ratio; Future  -     CBC w AUTO Differential; Future              Follow Up     Return in about 6 months (around 7/13/2023).    Patient was given instructions and counseling regarding her condition or for health maintenance advice. Please see specific information pulled into the AVS if appropriate.

## 2023-01-18 ENCOUNTER — TELEPHONE (OUTPATIENT)
Dept: FAMILY MEDICINE CLINIC | Facility: CLINIC | Age: 68
End: 2023-01-18
Payer: MEDICARE

## 2023-01-18 NOTE — TELEPHONE ENCOUNTER
Caller: Jessie Peck    Relationship: Self    Best call back number: [phone]    Who are you requesting to speak with (clinical staff, provider,  specific staff member): CLINICAL    What was the call regarding: PATIENT STATES SHE WOULD LIKE A CALL BACK REGARDING HER UPCOMING APPOINTMENT.    Do you require a callback: YES   





6 Easy-to-Read


## 2023-01-27 ENCOUNTER — OFFICE VISIT (OUTPATIENT)
Dept: FAMILY MEDICINE CLINIC | Facility: CLINIC | Age: 68
End: 2023-01-27
Payer: MEDICARE

## 2023-01-27 VITALS
TEMPERATURE: 96.7 F | RESPIRATION RATE: 16 BRPM | WEIGHT: 149.8 LBS | BODY MASS INDEX: 24.08 KG/M2 | DIASTOLIC BLOOD PRESSURE: 80 MMHG | SYSTOLIC BLOOD PRESSURE: 120 MMHG | OXYGEN SATURATION: 98 % | HEIGHT: 66 IN | HEART RATE: 67 BPM

## 2023-01-27 DIAGNOSIS — Z00.00 WELCOME TO MEDICARE PREVENTIVE VISIT: Primary | ICD-10-CM

## 2023-01-27 PROCEDURE — 1159F MED LIST DOCD IN RCRD: CPT | Performed by: NURSE PRACTITIONER

## 2023-01-27 PROCEDURE — G0402 INITIAL PREVENTIVE EXAM: HCPCS | Performed by: NURSE PRACTITIONER

## 2023-01-27 PROCEDURE — 1170F FXNL STATUS ASSESSED: CPT | Performed by: NURSE PRACTITIONER

## 2023-01-27 PROCEDURE — 93000 ELECTROCARDIOGRAM COMPLETE: CPT | Performed by: NURSE PRACTITIONER

## 2023-01-27 NOTE — PROGRESS NOTES
The ABCs of the Annual Wellness Visit  [place] to Medicare Visit    Subjective     Jessie Peck is a 67 y.o. female who presents for a  Welcome to Medicare Visit.    The following portions of the patient's history were reviewed and   updated as appropriate: allergies, current medications, past family history, past medical history, past social history, past surgical history and problem list.     Compared to one year ago, the patient feels her physical   health is better.    Compared to one year ago, the patient feels her mental   health is better.    Recent Hospitalizations:  She was not admitted to the hospital during the last year.       Current Medical Providers:  Patient Care Team:  Percy Duke APRN as PCP - General (Nurse Practitioner)    Outpatient Medications Prior to Visit   Medication Sig Dispense Refill   • calcium carbonate (OS-YEYO) 1250 (500 Ca) MG tablet Calcium 500 500 mg calcium (1,250 mg) oral tablet take 1 tablet by oral route daily   Active     • escitalopram (LEXAPRO) 10 MG tablet Take 10 mg by mouth Daily.     • hydrOXYzine (ATARAX) 25 MG tablet Take 1 tablet by mouth 3 (Three) Times a Day As Needed for Anxiety. 30 tablet 2   • levothyroxine (SYNTHROID, LEVOTHROID) 125 MCG tablet Take 1 tablet by mouth Every Morning. 90 tablet 1   • raloxifene (EVISTA) 60 MG tablet Take 1 tablet by mouth Daily. 90 tablet 1   • Cholecalciferol 25 MCG (1000 UT) capsule Vitamin D3 1,000 unit oral capsule take 1 capsule by oral route daily   Active       No facility-administered medications prior to visit.       No opioid medication identified on active medication list. I have reviewed chart for other potential  high risk medication/s and harmful drug interactions in the elderly.          Aspirin is not on active medication list.  Aspirin use is not indicated based on review of current medical condition/s. Risk of harm outweighs potential benefits.  .    Patient Active Problem List   Diagnosis   • Osteopenia   •  "Pruritus   • Skin cancer   • Thyroid disorder   • Tinnitus of both ears   • Elevated cholesterol   • Anxiety     Advance Care Planning  Advance Directive is not on file.  ACP discussion was held with the patient during this visit. Patient does not have an advance directive, information provided.       Objective   Vitals:    01/27/23 1418   BP: 120/80   BP Location: Right arm   Patient Position: Sitting   Cuff Size: Adult   Pulse: 67   Resp: 16   Temp: 96.7 °F (35.9 °C)   TempSrc: Temporal   SpO2: 98%   Weight: 67.9 kg (149 lb 12.8 oz)   Height: 167.6 cm (65.98\")   PainSc: 0-No pain     Estimated body mass index is 24.19 kg/m² as calculated from the following:    Height as of this encounter: 167.6 cm (65.98\").    Weight as of this encounter: 67.9 kg (149 lb 12.8 oz).    BMI is within normal parameters. No other follow-up for BMI required.      Does the patient have evidence of cognitive impairment?   No    Lab Results   Component Value Date    TRIG 137 01/13/2023    HDL 64 (H) 01/13/2023     (H) 01/13/2023    VLDL 24 01/13/2023         ECG 12 Lead    Date/Time: 1/27/2023 3:12 PM  Performed by: Percy Duke APRN  Authorized by: Percy Duke APRN   Comparison: compared with previous ECG   Similar to previous ECG  Rhythm: sinus rhythm  Rate: bradycardic  BPM: 59    Clinical impression: normal ECG               HEALTH RISK ASSESSMENT    Smoking Status:  Social History     Tobacco Use   Smoking Status Never   Smokeless Tobacco Never     Alcohol Consumption:  Social History     Substance and Sexual Activity   Alcohol Use Yes   • Alcohol/week: 4.0 - 6.0 standard drinks   • Types: 4 - 6 Glasses of wine per week    Comment: 1-4 DRINKS PER WEEK       Fall Risk Screen:    STEADI Fall Risk Assessment was completed, and patient is at MODERATE risk for falls. Assessment completed on:1/27/2023    Depression Screen:   PHQ-2/PHQ-9 Depression Screening 1/13/2023   Little Interest or Pleasure in Doing Things 0-->not at all "   Feeling Down, Depressed or Hopeless 0-->not at all   PHQ-9: Brief Depression Severity Measure Score 0       Health Habits and Functional and Cognitive Screening:  Functional & Cognitive Status 1/27/2023   Do you have difficulty preparing food and eating? No   Do you have difficulty bathing yourself, getting dressed or grooming yourself? No   Do you have difficulty using the toilet? No   Do you have difficulty moving around from place to place? No   Do you have trouble with steps or getting out of a bed or a chair? No   Current Diet Well Balanced Diet   Dental Exam Up to date   Eye Exam Up to date   Exercise (times per week) 3 times per week   Current Exercises Include Walking   Do you need help using the phone?  No   Are you deaf or do you have serious difficulty hearing?  No   Do you need help with transportation? No   Do you need help shopping? No   Do you need help preparing meals?  No   Do you need help with housework?  No   Do you need help with laundry? No   Do you need help taking your medications? No   Do you need help managing money? No   Do you ever drive or ride in a car without wearing a seat belt? No   Have you felt unusual stress, anger or loneliness in the last month? Yes   Who do you live with? Spouse   If you need help, do you have trouble finding someone available to you? No   Have you been bothered in the last four weeks by sexual problems? No   Do you have difficulty concentrating, remembering or making decisions? Yes       Visual Acuity:    No results found.    Age-appropriate Screening Schedule:  Refer to the list below for future screening recommendations based on patient's age, sex and/or medical conditions. Orders for these recommended tests are listed in the plan section. The patient has been provided with a written plan.    Health Maintenance   Topic Date Due   • LIPID PANEL  01/13/2024   • DXA SCAN  02/18/2024   • MAMMOGRAM  03/11/2024   • TDAP/TD VACCINES (2 - Td or Tdap) 08/30/2028    • INFLUENZA VACCINE  Completed   • ZOSTER VACCINE  Completed        CMS Preventative Services Quick Reference  Risk Factors Identified During Encounter    None Identified  The above risks/problems have been discussed with the patient.  Pertinent information has been shared with the patient in the After Visit Summary.  Follow up plans and orders are seen below in the Assessment/Plan Section.    Diagnoses and all orders for this visit:    1. Welcome to Medicare preventive visit (Primary)    Other orders  -     ECG 12 Lead        Follow Up:   Initial Medicare Visit in one year    An After Visit Summary and PPPS were made available to the patient.

## 2023-03-14 ENCOUNTER — HOSPITAL ENCOUNTER (OUTPATIENT)
Dept: MAMMOGRAPHY | Facility: HOSPITAL | Age: 68
Discharge: HOME | End: 2023-03-14
Admitting: NURSE PRACTITIONER
Payer: MEDICARE

## 2023-03-14 DIAGNOSIS — Z12.31 ENCOUNTER FOR SCREENING MAMMOGRAM FOR MALIGNANT NEOPLASM OF BREAST: ICD-10-CM

## 2023-03-14 PROCEDURE — 77063 BREAST TOMOSYNTHESIS BI: CPT

## 2023-03-14 PROCEDURE — 77067 SCR MAMMO BI INCL CAD: CPT

## 2023-03-27 RX ORDER — ESCITALOPRAM OXALATE 10 MG/1
10 TABLET ORAL DAILY
Qty: 90 TABLET | Refills: 1 | Status: SHIPPED | OUTPATIENT
Start: 2023-03-27

## 2023-04-27 ENCOUNTER — TELEPHONE (OUTPATIENT)
Dept: FAMILY MEDICINE CLINIC | Facility: CLINIC | Age: 68
End: 2023-04-27

## 2023-04-27 NOTE — TELEPHONE ENCOUNTER
She is having a side effect and we can stop if she would like to start one of the others for osteoporosis like prolia, or fossamax.

## 2023-04-27 NOTE — TELEPHONE ENCOUNTER
"Caller: Cisco Rhonaoralia \"Madison\"    Relationship: Self    Best call back number: [phone]    What medications are you currently taking:   Current Outpatient Medications on File Prior to Visit   Medication Sig Dispense Refill   • calcium carbonate (OS-YEYO) 1250 (500 Ca) MG tablet Calcium 500 500 mg calcium (1,250 mg) oral tablet take 1 tablet by oral route daily   Active     • escitalopram (LEXAPRO) 10 MG tablet Take 1 tablet by mouth Daily. 90 tablet 1   • escitalopram (LEXAPRO) 10 MG tablet Take 1 tablet by mouth Daily. 90 tablet 1   • hydrOXYzine (ATARAX) 25 MG tablet Take 1 tablet by mouth 3 (Three) Times a Day As Needed for Anxiety. 30 tablet 2   • levothyroxine (SYNTHROID, LEVOTHROID) 125 MCG tablet Take 1 tablet by mouth Every Morning. 90 tablet 1   • raloxifene (EVISTA) 60 MG tablet Take 1 tablet by mouth Daily. 90 tablet 1     No current facility-administered medications on file prior to visit.          Which medication are you concerned about: raloxifene (EVISTA) 60 MG tablet    Who prescribed you this medication: [facility]    What are your concerns: PATIENT WAS INFORMED TO LET [facility] KNOW IF SHE HAS HAD HOT FLASHES WITH THIS MEDICATION. PATIENT HAS THEM ON AND OFF.     How long have you had these concerns: SINCE STARTING   "

## 2023-08-01 ENCOUNTER — TELEPHONE (OUTPATIENT)
Dept: FAMILY MEDICINE CLINIC | Facility: CLINIC | Age: 68
End: 2023-08-01

## 2023-08-01 NOTE — TELEPHONE ENCOUNTER
"Caller: Jessie Peck \"Madison\"    Relationship: Self    Best call back number: [phone]     What is the best time to reach you: ANY    Who are you requesting to speak with (clinical staff, provider,  specific staff member): CLINICAL STAFF    What was the call regarding: PATIENT CALLED STATING THAT HER GASTRO APPOINTMENT ISNT UNTIL NOVEMBER AND SHE WOULD LIKE TO KNOW IF THERE IS ANYTHING ELSE SHE CAN DO BEFORE THEN  "

## 2023-08-02 DIAGNOSIS — R19.4 BOWEL HABIT CHANGES: Primary | ICD-10-CM

## 2023-08-18 ENCOUNTER — OFFICE VISIT (OUTPATIENT)
Dept: PODIATRY | Facility: CLINIC | Age: 68
End: 2023-08-18
Payer: MEDICARE

## 2023-08-18 VITALS — TEMPERATURE: 96.9 F | BODY MASS INDEX: 23.3 KG/M2 | HEIGHT: 66 IN | WEIGHT: 145 LBS

## 2023-08-18 DIAGNOSIS — M20.12 HALLUX VALGUS OF LEFT FOOT: Primary | ICD-10-CM

## 2023-08-18 DIAGNOSIS — M79.672 FOOT PAIN, LEFT: ICD-10-CM

## 2023-08-18 DIAGNOSIS — M20.5X2 HALLUX LIMITUS OF LEFT FOOT: ICD-10-CM

## 2023-08-18 RX ORDER — TESTOSTERONE CYPIONATE 200 MG/ML
4 INJECTION INTRAMUSCULAR ONCE
Status: COMPLETED | OUTPATIENT
Start: 2023-08-18 | End: 2023-08-18

## 2023-08-18 RX ORDER — BUPIVACAINE HYDROCHLORIDE 5 MG/ML
0.5 INJECTION, SOLUTION PERINEURAL ONCE
Status: COMPLETED | OUTPATIENT
Start: 2023-08-18 | End: 2023-08-18

## 2023-08-18 RX ORDER — DICLOFENAC SODIUM 75 MG/1
75 TABLET, DELAYED RELEASE ORAL 2 TIMES DAILY
Qty: 60 TABLET | Refills: 1 | Status: SHIPPED | OUTPATIENT
Start: 2023-08-18 | End: 2023-10-17

## 2023-08-18 RX ADMIN — TESTOSTERONE CYPIONATE 4 MG: 200 INJECTION INTRAMUSCULAR at 08:24

## 2023-08-18 RX ADMIN — BUPIVACAINE HYDROCHLORIDE 0.5 ML: 5 INJECTION, SOLUTION PERINEURAL at 08:23

## 2023-08-18 NOTE — PROGRESS NOTES
[facility]    Today's Date: 08/18/23    Patient Name: Jessie Peck  MRN: [id number]  CSN: [id number]  PCP: Percy Duke APRN  Referring Provider: Percy Duke APRN    SUBJECTIVE     Chief Complaint   Patient presents with    Left Foot - Pain, Establish Care, Bunions     States she jammed toes a few months ago states she has began running and now gets pain     HPI: Jessie Peck, a 68 y.o.female, presents to clinic.    New, Established, New Problem:  New    Location:  Left 1st MPJ    Duration:  Spring 2023    Onset:  trauma    Nature:  Sore    Aggravating factors:  Patient relates pain is aggravated by shoe gear and ambulation.      Previous Treatment:  None    Patient denies any fevers, chills, nausea, vomiting, shortness of breath, nor any other constitutional signs nor symptoms.    No other pedal complaints at this time.    Past Medical History:   Diagnosis Date    Anemia 1962    Hospitalized /transfusions    Cancer     Cataract     Surgery to remove November 2022    Ear fullness, bilateral     Ear itching     Hearing loss     Hyperthyroidism     Hypothyroidism 2000??    After Thyroid Ablation    Osteopenia     Skin cancer     Thyroid disorder     Tinnitus of both ears      Past Surgical History:   Procedure Laterality Date    APPENDECTOMY  1967    COLONOSCOPY  2018    EYE SURGERY  Nov 2022    Cararacts removed     Family History   Problem Relation Age of Onset    Cancer Mother         Cancer on a tumor at top of her stomach. Tumor removed. Cancer never came back.    Osteoporosis Mother     Arthritis Mother     Stroke Father     Heart disease Father         Triple by-pass; pace maker    Cancer Father         Squamous cell on lip. Cancer removed.    Skin cancer Father 80    Esophageal cancer Maternal Grandfather      Social History     Socioeconomic History    Marital status:    Tobacco Use    Smoking status: Never    Smokeless tobacco: Never   Substance and Sexual Activity     Alcohol use: Yes     Alcohol/week: 4.0 - 6.0 standard drinks     Types: 4 - 6 Glasses of wine per week     Comment: 1-4 DRINKS PER WEEK    Drug use: Never    Sexual activity: Yes     Partners: Male     Birth control/protection: Post-menopausal     No Known Allergies  Current Outpatient Medications   Medication Sig Dispense Refill    calcium carbonate (OS-YEYO) 1250 (500 Ca) MG tablet Calcium 500 500 mg calcium (1,250 mg) oral tablet take 1 tablet by oral route daily   Active      escitalopram (LEXAPRO) 10 MG tablet Take 1 tablet by mouth Daily. 90 tablet 1    levothyroxine (SYNTHROID, LEVOTHROID) 125 MCG tablet Take 1 tablet by mouth Every Morning. 90 tablet 1    diclofenac (VOLTAREN) 75 MG EC tablet Take 1 tablet by mouth 2 (Two) Times a Day for 60 days. 60 tablet 1    Diclofenac Sodium (VOLTAREN) 1 % gel gel Apply 4 g topically to the appropriate area as directed 4 (Four) Times a Day. 100 g 5     Current Facility-Administered Medications   Medication Dose Route Frequency Provider Last Rate Last Admin    bupivacaine (MARCAINE) 0.5 % injection 0.5 mL  0.5 mL Injection Once Bimal Valdivia DPM        dexAMETHasone sodium phosphate injection 4 mg  4 mg Intra-articular Once Bimal Valdivia DPM         Review of Systems   Constitutional: Negative.    Musculoskeletal:  Positive for arthralgias.   All other systems reviewed and are negative.    OBJECTIVE     Vitals:    08/18/23 0738   Temp: 96.9 øF (36.1 øC)       WBC   Date Value Ref Range Status   07/14/2023 4.28 3.40 - 10.80 10*3/mm3 Final     RBC   Date Value Ref Range Status   07/14/2023 4.61 3.77 - 5.28 10*6/mm3 Final     Hemoglobin   Date Value Ref Range Status   07/14/2023 13.9 12.0 - 15.9 g/dL Final     Hematocrit   Date Value Ref Range Status   07/14/2023 41.8 34.0 - 46.6 % Final     MCV   Date Value Ref Range Status   07/14/2023 90.7 79.0 - 97.0 fL Final     MCH   Date Value Ref Range Status   07/14/2023 30.2 26.6 - 33.0 pg Final     MCHC   Date  Value Ref Range Status   07/14/2023 33.3 31.5 - 35.7 g/dL Final     RDW   Date Value Ref Range Status   07/14/2023 12.1 (L) 12.3 - 15.4 % Final     RDW-SD   Date Value Ref Range Status   07/14/2023 40.3 37.0 - 54.0 fl Final     MPV   Date Value Ref Range Status   07/14/2023 9.9 6.0 - 12.0 fL Final     Platelets   Date Value Ref Range Status   07/14/2023 207 140 - 450 10*3/mm3 Final     Neutrophil %   Date Value Ref Range Status   07/14/2023 54.3 42.7 - 76.0 % Final     Lymphocyte %   Date Value Ref Range Status   07/14/2023 32.7 19.6 - 45.3 % Final     Monocyte %   Date Value Ref Range Status   07/14/2023 9.3 5.0 - 12.0 % Final     Eosinophil %   Date Value Ref Range Status   07/14/2023 3.0 0.3 - 6.2 % Final     Basophil %   Date Value Ref Range Status   07/14/2023 0.5 0.0 - 1.5 % Final     Immature Grans %   Date Value Ref Range Status   07/14/2023 0.2 0.0 - 0.5 % Final     Neutrophils, Absolute   Date Value Ref Range Status   07/14/2023 2.32 1.70 - 7.00 10*3/mm3 Final     Lymphocytes, Absolute   Date Value Ref Range Status   07/14/2023 1.40 0.70 - 3.10 10*3/mm3 Final     Monocytes, Absolute   Date Value Ref Range Status   07/14/2023 0.40 0.10 - 0.90 10*3/mm3 Final     Eosinophils, Absolute   Date Value Ref Range Status   07/14/2023 0.13 0.00 - 0.40 10*3/mm3 Final     Basophils, Absolute   Date Value Ref Range Status   07/14/2023 0.02 0.00 - 0.20 10*3/mm3 Final     Immature Grans, Absolute   Date Value Ref Range Status   07/14/2023 0.01 0.00 - 0.05 10*3/mm3 Final     nRBC   Date Value Ref Range Status   07/14/2023 0.0 0.0 - 0.2 /100 WBC Final         Lab Results   Component Value Date    GLUCOSE 92 07/14/2023    BUN 16 07/14/2023    CREATININE 0.94 07/14/2023    EGFR 67.1 07/14/2023    BCR 17.0 07/14/2023    K 4.5 07/14/2023    CO2 27.6 07/14/2023    CALCIUM 9.2 07/14/2023    ALBUMIN 4.2 07/14/2023    BILITOT 0.2 07/14/2023    AST 19 07/14/2023    ALT 11 07/14/2023       Patient seen in no apparent distress.       PHYSICAL EXAM:     Foot/Ankle Exam    GENERAL  Appearance:  appears stated age, elderly and healthy  Orientation:  AAOx3  Affect:  appropriate  Gait:  unimpaired  Assistance:  independent  Right shoe gear: casual shoe  Left shoe gear: casual shoe    VASCULAR     Right Foot Vascularity   Dorsalis pedis:  2+  Posterior tibial:  2+  Skin temperature:  warm  Edema grading:  None  CFT:  < 3 seconds  Pedal hair growth:  Present  Varicosities:  mild varicosities     Left Foot Vascularity   Dorsalis pedis:  2+  Posterior tibial:  2+  Skin temperature:  warm  Edema grading:  None  CFT:  < 3 seconds  Pedal hair growth:  Present  Varicosities:  mild varicosities     NEUROLOGIC     Right Foot Neurologic   Normal sensation    Light touch sensation: normal  Vibratory sensation: normal  Hot/Cold sensation: normal  Protective Sensation using Holland-Deejay Monofilament:   Sites intact: 10  Sites tested: 10     Left Foot Neurologic   Normal sensation    Light touch sensation: normal  Vibratory sensation: normal  Hot/Cold sensation:  normal  Protective Sensation using Holland-Deejay Monofilament:   Sites intact: 10  Sites tested: 10    MUSCULOSKELETAL     Left Foot Musculoskeletal   Tenderness:  MTP 1 dorsal tenderness  Hallux limitus: Yes      MUSCLE STRENGTH     Right Foot Muscle Strength   Foot dorsiflexion:  4  Foot plantar flexion:  4  Foot inversion:  4  Foot eversion:  4     Left Foot Muscle Strength   Foot dorsiflexion:  4  Foot plantar flexion:  4  Foot inversion:  4  Foot eversion:  4    RANGE OF MOTION     Right Foot Range of Motion   Foot and ankle ROM within normal limits       Left Foot Range of Motion   Foot and ankle ROM within normal limits    1st MTP extension: decreased with pain  1st MTP flexion: decreased with pain    DERMATOLOGIC      Right Foot Dermatologic   Skin  Right foot skin is intact.      Left Foot Dermatologic   Skin  Left foot skin is intact.     RADIOLOGY:      XR Foot 3+ View Left    Result  Date: 8/18/2023  Narrative: IN-OFFICE IMAGING:  Standing, weightbearing, 3 view, AP, MO, Lateral, left foot Indication: Foot pain Findings: Joint narrowing first MPJ consistent with hallux limitus.  No periosteal reactions nor osteolytic changes seen.  No occult fractures seen.  Osteopenia changes seen throughout consistent with the patient's age. Comparison: No comparison views available.     ASSESSMENT/PLAN     Diagnoses and all orders for this visit:    1. Hallux valgus of left foot (Primary)  -     bupivacaine (MARCAINE) 0.5 % injection 0.5 mL  -     dexAMETHasone sodium phosphate injection 4 mg    2. Hallux limitus of left foot  -     diclofenac (VOLTAREN) 75 MG EC tablet; Take 1 tablet by mouth 2 (Two) Times a Day for 60 days.  Dispense: 60 tablet; Refill: 1  -     Diclofenac Sodium (VOLTAREN) 1 % gel gel; Apply 4 g topically to the appropriate area as directed 4 (Four) Times a Day.  Dispense: 100 g; Refill: 5    3. Foot pain, left        Comprehensive lower extremity examination and evaluation was performed.    Discussed findings and treatment plan including risks, benefits, and treatment options with patient in detail. Patient agreed with treatment plan.    Medications and allergies reviewed.  Reviewed available lab values along with other pertinent labs.  These were discussed with the patient.    Injection  Date/Time: 08/18/2023  Performed by: Bimal Valdivia DPM  Authorized by: Bimal Valdivia DPM     Consent: Verbal consent obtained. Written consent obtained.  Risks and benefits: risks, benefits and alternatives were discussed  Consent given by: patient  Patient identity confirmed: verbally with patient  Indications: pain relief    Betadine prep x 3 to the planned injection site.    Injection site: Left 1st MPJ    Sedation:  Patient sedated: no    Patient position: sitting  Needle size: 27 G  Local anesthetic: 1.0 mL 0.25% Marcaine plain and 1.0 mL Decadron 4 mg/mL.   Outcome: pain  improved  Patient tolerance: Patient tolerated the procedure well with no immediate complications    Discussed proper shoegear for the patient's feet and medical condition.  Patient states understanding and agreement with this plan.    Rice Therapy: It is important to treat any injury as soon as possible to help control swelling and increase recovery time. The recognized regimen for immediate treatment of sport injuries includes rest, ice (cold application), compression, and elevation (RICE). Remove the injured athlete from play, apply ice to the affected area, wrap or compress the injured area with an elastic bandage when appropriate, and elevate the injured area above heart level to reduce swelling.  The patient is to not use ice for longer than 20 minutes at a time, with at least 20 minutes of no ice usage between applications.  The patient states understanding and agreement with this plan.    Patient instructed use OTC analgesics with dosing per package insert as needed.  Patient states understanding and agreement with this plan.    An After Visit Summary was printed and given to the patient at discharge, including (if requested) any available informative/educational handouts regarding diagnosis, treatment, or medications. All questions were answered to patient/family satisfaction. Should symptoms fail to improve or worsen they agree to call or return to clinic or to go to the Emergency Department. Discussed the importance of following up with any needed screening tests/labs/specialist appointments and any requested follow-up recommended by me today. Importance of maintaining follow-up discussed and patient accepts that missed appointments can delay diagnosis and potentially lead to worsening of conditions.    Return if symptoms worsen or fail to improve., or sooner if acute issues arise.    This document has been electronically signed by Bimal Valdivia DPM on August 18, 2023 08:14 EDT

## 2023-09-11 ENCOUNTER — HOSPITAL ENCOUNTER (OUTPATIENT)
Dept: CT IMAGING | Facility: HOSPITAL | Age: 68
Discharge: HOME | End: 2023-09-11
Admitting: NURSE PRACTITIONER
Payer: MEDICARE

## 2023-09-11 DIAGNOSIS — R19.4 BOWEL HABIT CHANGES: ICD-10-CM

## 2023-09-11 LAB
CREAT BLDA-MCNC: 0.9 MG/DL
EGFRCR SERPLBLD CKD-EPI 2021: 70.7 ML/MIN/1.73

## 2023-09-11 PROCEDURE — 74177 CT ABD & PELVIS W/CONTRAST: CPT

## 2023-09-11 PROCEDURE — 82565 ASSAY OF CREATININE: CPT

## 2023-09-11 PROCEDURE — 25510000001 IOPAMIDOL PER 1 ML: Performed by: NURSE PRACTITIONER

## 2023-09-11 RX ADMIN — IOPAMIDOL 100 ML: 755 INJECTION, SOLUTION INTRAVENOUS at 13:10

## 2023-09-15 ENCOUNTER — TELEPHONE (OUTPATIENT)
Dept: FAMILY MEDICINE CLINIC | Facility: CLINIC | Age: 68
End: 2023-09-15
Payer: MEDICARE

## 2023-09-15 RX ORDER — ESCITALOPRAM OXALATE 10 MG/1
10 TABLET ORAL DAILY
Qty: 90 TABLET | Refills: 1 | Status: SHIPPED | OUTPATIENT
Start: 2023-09-15

## 2023-09-15 NOTE — TELEPHONE ENCOUNTER
Patient wanted to know if the diverticulosis was a new issue she has or if it showed up on her previous scan in 2018. If so she was unaware of this.     She is wanting to know her next steps.       Scanning in colonscopy from 2018

## 2023-09-15 NOTE — TELEPHONE ENCOUNTER
"Caller: Jessie Peck \"Madison\"    Relationship: Self    Best call back number: [phone]     What is the best time to reach you: ANY    Who are you requesting to speak with (clinical staff, provider,  specific staff member): CLINICAL STAFF    What was the call regarding: PATIENT WOULD LIKE TO SPEAK TO A NURSE REGARDING OLDER IMAGING FROM BEFORE [facility] AND HER NEXT STEPS AFTER HER LATEST CT SCAN  "

## 2023-09-15 NOTE — TELEPHONE ENCOUNTER
"Caller: Jessie Peck \"Madison\"    Relationship: Self    Best call back number: [phone]     Requested Prescriptions:   Requested Prescriptions     Pending Prescriptions Disp Refills    escitalopram (LEXAPRO) 10 MG tablet 90 tablet 1     Sig: Take 1 tablet by mouth Daily.        Pharmacy where request should be sent: [address] [phone] [facility] [phone]      Last office visit with prescribing clinician: 7/13/2023   Last telemedicine visit with prescribing clinician: Visit date not found   Next office visit with prescribing clinician: 1/15/2024     Does the patient have less than a 3 day supply:  [x] Yes  [] No    Would you like a call back once the refill request has been completed: [] Yes [x] No    If the office needs to give you a call back, can they leave a voicemail: [] Yes [x] No    Jose R Davila Rep   09/15/23 08:36 EDT   "

## 2023-09-15 NOTE — TELEPHONE ENCOUNTER
Your had a mild diverticulosis that was  mentioned in your colonoscopy in 2018.  So I don't think it has changed since then.

## 2023-11-01 ENCOUNTER — OFFICE VISIT (OUTPATIENT)
Dept: GASTROENTEROLOGY | Facility: CLINIC | Age: 68
End: 2023-11-01
Payer: MEDICARE

## 2023-11-01 VITALS
DIASTOLIC BLOOD PRESSURE: 82 MMHG | HEIGHT: 66 IN | WEIGHT: 149 LBS | BODY MASS INDEX: 23.95 KG/M2 | HEART RATE: 68 BPM | TEMPERATURE: 96.8 F | SYSTOLIC BLOOD PRESSURE: 142 MMHG

## 2023-11-01 DIAGNOSIS — R19.4 CHANGE IN BOWEL HABITS: Primary | ICD-10-CM

## 2023-11-01 DIAGNOSIS — Z86.010 PERSONAL HISTORY OF COLONIC POLYPS: ICD-10-CM

## 2023-11-01 PROBLEM — Z86.0100 PERSONAL HISTORY OF COLONIC POLYPS: Status: ACTIVE | Noted: 2023-11-01

## 2023-11-01 LAB
ALBUMIN SERPL-MCNC: 5 G/DL (ref 3.5–5.2)
ALBUMIN/GLOB SERPL: 2.8 G/DL
ALP SERPL-CCNC: 56 U/L (ref 39–117)
ALT SERPL-CCNC: 14 U/L (ref 1–33)
AST SERPL-CCNC: 23 U/L (ref 1–32)
BILIRUB SERPL-MCNC: 0.5 MG/DL (ref 0–1.2)
BUN SERPL-MCNC: 12 MG/DL (ref 8–23)
BUN/CREAT SERPL: 13.2 (ref 7–25)
CALCIUM SERPL-MCNC: 10.7 MG/DL (ref 8.6–10.5)
CHLORIDE SERPL-SCNC: 103 MMOL/L (ref 98–107)
CO2 SERPL-SCNC: 29.1 MMOL/L (ref 22–29)
CREAT SERPL-MCNC: 0.91 MG/DL (ref 0.57–1)
EGFRCR SERPLBLD CKD-EPI 2021: 69.7 ML/MIN/1.73
GLOBULIN SER CALC-MCNC: 1.8 GM/DL
GLUCOSE SERPL-MCNC: 75 MG/DL (ref 65–99)
POTASSIUM SERPL-SCNC: 4.2 MMOL/L (ref 3.5–5.2)
PROT SERPL-MCNC: 6.8 G/DL (ref 6–8.5)
SODIUM SERPL-SCNC: 142 MMOL/L (ref 136–145)
TSH SERPL DL<=0.005 MIU/L-ACNC: 0.98 UIU/ML (ref 0.27–4.2)

## 2023-11-01 NOTE — PROGRESS NOTES
Chief Complaint   Patient presents with    Change in BMs       HPI    Jessie Peck is a  68 y.o. female here establish care as a new patient for change in bowel habits.    Patient will also follow with Dr. Ponce.    Past medical history of thyroid disease, skin cancer, osteopenia along with anemia.    Her last colonoscopy was 2018 (Dr. Robison) - diverticulosis, HP polyp in the rectum, internal hemorrhoids.     Patient reports change in bowel habits with onset around February.  Prior to that she was having 1 bowel movement a day fairly predictable and uneventful.  With onset of symptoms she developed fecal urgency, change in stool caliber and episodes of fecal incontinence where she had to wear a protective pad.  She started herself on Citrucel which did help but not completely resolved symptoms.  No abdominal pain, rectal pain or rectal bleeding.  Appetite is good.  Weight is stable.    No family history of colon cancer.    Past Medical History:   Diagnosis Date    Anemia 1962    Hospitalized /transfusions    Cancer     Cataract     Surgery to remove November 2022    Ear fullness, bilateral     Ear itching     Hearing loss     Hyperthyroidism     Hypothyroidism 2000??    After Thyroid Ablation    Osteopenia     Skin cancer     Thyroid disorder     Tinnitus of both ears        Past Surgical History:   Procedure Laterality Date    APPENDECTOMY  1967    COLONOSCOPY  2018    EYE SURGERY  Nov 2022    Cararacts removed       Scheduled Meds:     Continuous Infusions: No current facility-administered medications for this visit.      PRN Meds:     No Known Allergies    Social History     Socioeconomic History    Marital status:    Tobacco Use    Smoking status: Never    Smokeless tobacco: Never   Substance and Sexual Activity    Alcohol use: Yes     Alcohol/week: 4.0 - 6.0 standard drinks of alcohol     Types: 4 - 6 Glasses of wine per week     Comment: 1-4 DRINKS PER WEEK    Drug use: Never    Sexual activity: Yes      Partners: Male     Birth control/protection: Post-menopausal       Family History   Problem Relation Age of Onset    Cancer Mother         Cancer on a tumor at top of her stomach. Tumor removed. Cancer never came back.    Osteoporosis Mother     Arthritis Mother     Stroke Father     Heart disease Father         Triple by-pass; pace maker    Cancer Father         Squamous cell on lip. Cancer removed.    Skin cancer Father 80    Esophageal cancer Maternal Grandfather        Review of Systems   Gastrointestinal:         + Change in bowel habits       Vitals:    11/01/23 0929   BP: 142/82   Pulse: 68   Temp: 96.8 °F (36 °C)       Physical Exam  Constitutional:       Appearance: She is well-developed.   Abdominal:      General: Bowel sounds are normal. There is no distension.      Palpations: Abdomen is soft. There is no mass.      Tenderness: There is no abdominal tenderness. There is no guarding.      Hernia: No hernia is present.   Skin:     General: Skin is warm and dry.      Capillary Refill: Capillary refill takes less than 2 seconds.   Neurological:      Mental Status: She is alert and oriented to person, place, and time.   Psychiatric:         Behavior: Behavior normal.     Assessment    Diagnoses and all orders for this visit:    1. Change in bowel habits (Primary)  -     CBC (No Diff)  -     Comprehensive Metabolic Panel  -     Celiac Comprehensive Panel  -     TSH  -     Case Request; Standing  -     Case Request    2. Personal history of colonic polyps  -     Case Request; Standing  -     Case Request    Plan    Proceed to updated colonoscopy with Dr. Ponce for symptoms of change in bowel habits  Risk/Benefits reviewed w/ the patient all questions were answered  Continue Citrucel regimen  Adhere to a high-fiber diet  Labs today as above  Further recommendations and follow-up pending the aforementioned work-up         TREVER Nava  [facility]  [address] [place] [address]  [address]  [place] [address]  Office: [phone]

## 2023-11-02 LAB
ENDOMYSIUM IGA SER QL: NEGATIVE
ERYTHROCYTE [DISTWIDTH] IN BLOOD BY AUTOMATED COUNT: 12.9 % (ref 12.3–15.4)
GLIADIN PEPTIDE IGA SER-ACNC: 7 UNITS (ref 0–19)
GLIADIN PEPTIDE IGG SER-ACNC: 4 UNITS (ref 0–19)
HCT VFR BLD AUTO: 43.5 % (ref 34–46.6)
HGB BLD-MCNC: 14.7 G/DL (ref 12–15.9)
IGA SERPL-MCNC: 74 MG/DL (ref 87–352)
MCH RBC QN AUTO: 31.2 PG (ref 26.6–33)
MCHC RBC AUTO-ENTMCNC: 33.8 G/DL (ref 31.5–35.7)
MCV RBC AUTO: 92.4 FL (ref 79–97)
PLATELET # BLD AUTO: 250 10*3/MM3 (ref 140–450)
RBC # BLD AUTO: 4.71 10*6/MM3 (ref 3.77–5.28)
TTG IGA SER-ACNC: <2 U/ML (ref 0–3)
TTG IGG SER-ACNC: <2 U/ML (ref 0–5)
WBC # BLD AUTO: 5.04 10*3/MM3 (ref 3.4–10.8)

## 2023-11-03 ENCOUNTER — PATIENT ROUNDING (BHMG ONLY) (OUTPATIENT)
Dept: GASTROENTEROLOGY | Facility: CLINIC | Age: 68
End: 2023-11-03
Payer: MEDICARE

## 2023-11-03 ENCOUNTER — TELEPHONE (OUTPATIENT)
Dept: GASTROENTEROLOGY | Facility: CLINIC | Age: 68
End: 2023-11-03
Payer: MEDICARE

## 2023-11-03 NOTE — TELEPHONE ENCOUNTER
Pt reviewed results via [employer]mag[employer].     Sent pt [employer] msg advising of results. Advised to call if any questions.

## 2023-11-03 NOTE — PROGRESS NOTES
November 01, 2023    Hello, may I speak with Jessie Peck?    My name is Graciela      I am  with [facility]  [address] [facility] [address] [place] [id number] [phone].    Before we get started may I verify your date of birth? 1955    I am calling to officially welcome you to our practice and ask about your recent visit. Is this a good time to talk? yes    Tell me about your visit with us. What things went well?  I thought it went great one of the best I have seen        We're always looking for ways to make our patients' experiences even better. Do you have recommendations on ways we may improve?  no    Overall were you satisfied with your first visit to our practice? yes       I appreciate you taking the time to speak with me today. Is there anything else I can do for you? no      Thank you, and have a great day.

## 2023-11-03 NOTE — PROGRESS NOTES
On losartan and nifedipine  -should improve with weight loss, dietary, and lifestyle changes Please inform the patient lab work shows normal thyroid function.  No evidence of anemia.  Normal liver function.  Calcium is just slightly above normal.  Would be something to mention when she sees her primary care provider again.  No evidence of celiac disease.  Await endoscopic findings.

## 2023-11-03 NOTE — TELEPHONE ENCOUNTER
----- Message from TREVER Nava sent at 11/3/2023  8:34 AM EDT -----  Please inform the patient lab work shows normal thyroid function.  No evidence of anemia.  Normal liver function.  Calcium is just slightly above normal.  Would be something to mention when she sees her primary care provider again.  No evidence of ce  liac disease.  Await endoscopic findings.

## 2023-12-04 ENCOUNTER — PATIENT MESSAGE (OUTPATIENT)
Dept: FAMILY MEDICINE CLINIC | Facility: CLINIC | Age: 68
End: 2023-12-04
Payer: MEDICARE

## 2023-12-04 NOTE — TELEPHONE ENCOUNTER
From: Jessie Peck  To: Percy Duke  Sent: 12/4/2023 12:21 PM EST  Subject: Escitalopram    I would like to stop taking Escitalopram. How should I do that? I kept taking it after a 1, and only, anxiety attack 2 years ago because I was sleeping a little better on it. Not sure it really has that benefit any more. Do you think this is a good idea?  Thanks!!  Madison

## 2023-12-13 ENCOUNTER — TELEPHONE (OUTPATIENT)
Dept: GASTROENTEROLOGY | Facility: CLINIC | Age: 68
End: 2023-12-13
Payer: MEDICARE

## 2023-12-14 ENCOUNTER — TELEPHONE (OUTPATIENT)
Dept: GASTROENTEROLOGY | Facility: CLINIC | Age: 68
End: 2023-12-14
Payer: MEDICARE

## 2023-12-14 ENCOUNTER — TELEPHONE (OUTPATIENT)
Dept: GASTROENTEROLOGY | Facility: CLINIC | Age: 68
End: 2023-12-14

## 2023-12-14 NOTE — TELEPHONE ENCOUNTER
"   Hub staff attempted to follow warm transfer process and was unsuccessful      Caller: Jessie Peck \"Madison\"     Relationship to patient: Self     Best call back number: [phone]      Patient is needing: PT IS OUT OF TOWN TILL  1/15/24 AT HER SONS HOME. THEY ARE 5 HOURS BEHIND [place]. PLEASE LEAVE A VM WITH SOME POSSIBLE DATES FOR THE COLONOSCOPY.      "

## 2023-12-14 NOTE — TELEPHONE ENCOUNTER
"  Hub staff attempted to follow warm transfer process and was unsuccessful     Caller: Jessie Peck \"Madison\"    Relationship to patient: Self    Best call back number: [phone]     Patient is needing: PT IS OUT OF TOWN TILL  1/15/24 AT HER SONS HOME. THEY ARE 5 HOURS BEHIND [place]. PLEASE LEAVE A VM WITH SOME POSSIBLE DATES FOR THE COLONOSCOPY.   "

## 2023-12-14 NOTE — TELEPHONE ENCOUNTER
JEREMY MONTAGUE PT SCHEDULED 01/17/02024 @1:35PM.PT VERBALIZED SHE STILL HAS PREP PACKET AND WILL UPDATE WITH NEW ARRIVAL TIME/DATE.OK FOR HUB TO READ

## 2024-01-15 ENCOUNTER — OFFICE VISIT (OUTPATIENT)
Dept: FAMILY MEDICINE CLINIC | Facility: CLINIC | Age: 69
End: 2024-01-15
Payer: MEDICARE

## 2024-01-15 VITALS
SYSTOLIC BLOOD PRESSURE: 126 MMHG | HEIGHT: 66 IN | OXYGEN SATURATION: 98 % | WEIGHT: 154.2 LBS | TEMPERATURE: 98.4 F | HEART RATE: 67 BPM | BODY MASS INDEX: 24.78 KG/M2 | DIASTOLIC BLOOD PRESSURE: 78 MMHG

## 2024-01-15 DIAGNOSIS — E07.9 THYROID DISORDER: Primary | ICD-10-CM

## 2024-01-15 DIAGNOSIS — R79.89 ABNORMAL CBC: ICD-10-CM

## 2024-01-15 DIAGNOSIS — E78.00 ELEVATED CHOLESTEROL: ICD-10-CM

## 2024-01-15 DIAGNOSIS — E55.9 VITAMIN D DEFICIENCY: ICD-10-CM

## 2024-01-15 DIAGNOSIS — H93.13 TINNITUS OF BOTH EARS: ICD-10-CM

## 2024-01-15 PROCEDURE — 1160F RVW MEDS BY RX/DR IN RCRD: CPT | Performed by: NURSE PRACTITIONER

## 2024-01-15 PROCEDURE — 1159F MED LIST DOCD IN RCRD: CPT | Performed by: NURSE PRACTITIONER

## 2024-01-15 PROCEDURE — 99214 OFFICE O/P EST MOD 30 MIN: CPT | Performed by: NURSE PRACTITIONER

## 2024-01-15 NOTE — PROGRESS NOTES
Answers submitted by the patient for this visit:  Other (Submitted on 1/15/2024)  Please describe your symptoms.: 6 month visit, Change in bowel movements; Colonoscopy scheduled Wednesday Jan 17, Stopped Anxiety medicine  Have you had these symptoms before?: Yes  How long have you been having these symptoms?: Greater than 2 weeks  Please list any medications you are currently taking for this condition.: None  Primary Reason for Visit (Submitted on 1/15/2024)  What is the primary reason for your visit?: Other  Chief Complaint  Thyroid disorder (6 month f/u)    Subjective        Jessie Peck presents to [facility]  History of Present Illness  Thyroid issue.  Had labs back in November so will wait on labs for 6 months.    Family concerned about memory.   She states can't multitask like she used to.        The following portions of the patient's history were personally reviewed and updated as appropriate: allergies, current medications, past medical history, past surgical history, past family history, and past social history.     Body mass index is 24.9 kg/m².    BMI is within normal parameters. No other follow-up for BMI required.      Past History:    Medical History: has a past medical history of Anemia (1962), Cancer, Cataract, Ear fullness, bilateral, Ear itching, Hearing loss, Hyperthyroidism, Hypothyroidism (2000??), Osteopenia, Skin cancer, Thyroid disorder, and Tinnitus of both ears.     Surgical History: has a past surgical history that includes Appendectomy (1967); Colonoscopy (2018); and Eye surgery (Nov 2022).     Family History: family history includes Arthritis in her mother; Cancer in her father and mother; Esophageal cancer in her maternal grandfather; Heart disease in her father; Osteoporosis in her mother; Skin cancer (age of onset: 80) in her father; Stroke in her father.     Social History: reports that she has never smoked. She has never used smokeless tobacco.  "She reports current alcohol use of about 4.0 - 6.0 standard drinks of alcohol per week. She reports that she does not use drugs.    Allergies: Patient has no known allergies.          Current Outpatient Medications:     calcium carbonate (OS-YEYO) 1250 (500 Ca) MG tablet, Calcium 500 500 mg calcium (1,250 mg) oral tablet take 1 tablet by oral route daily   Active, Disp: , Rfl:     Diclofenac Sodium (VOLTAREN) 1 % gel gel, Apply 4 g topically to the appropriate area as directed 4 (Four) Times a Day., Disp: 100 g, Rfl: 5    levothyroxine (SYNTHROID, LEVOTHROID) 125 MCG tablet, Take 1 tablet by mouth Every Morning., Disp: 90 tablet, Rfl: 1    Medications Discontinued During This Encounter   Medication Reason    escitalopram (LEXAPRO) 10 MG tablet *Therapy completed         Review of Systems   Constitutional:  Negative for fever.   Respiratory:  Negative for cough and shortness of breath.    Cardiovascular:  Negative for chest pain, palpitations and leg swelling.   Neurological:  Negative for dizziness, weakness, numbness and headache.        Objective         Vitals:    01/15/24 1256   BP: 126/78   BP Location: Right arm   Patient Position: Sitting   Cuff Size: Adult   Pulse: 67   Temp: 98.4 °F (36.9 °C)   SpO2: 98%   Weight: 69.9 kg (154 lb 3.2 oz)   Height: 167.6 cm (65.98\")     Body mass index is 24.9 kg/m².         Physical Exam  Vitals reviewed.   Constitutional:       Appearance: Normal appearance. She is well-developed.   HENT:      Head: Normocephalic and atraumatic.      Mouth/Throat:      Pharynx: No oropharyngeal exudate.   Eyes:      Conjunctiva/sclera: Conjunctivae normal.      Pupils: Pupils are equal, round, and reactive to light.   Cardiovascular:      Rate and Rhythm: Normal rate and regular rhythm.      Heart sounds: Normal heart sounds. No murmur heard.     No friction rub. No gallop.   Pulmonary:      Effort: Pulmonary effort is normal.      Breath sounds: Normal breath sounds. No wheezing or " rhonchi.   Skin:     General: Skin is warm and dry.   Neurological:      Mental Status: She is alert and oriented to person, place, and time.   Psychiatric:         Mood and Affect: Mood and affect normal.         Behavior: Behavior normal.         Thought Content: Thought content normal.         Judgment: Judgment normal.             Result Review :               Assessment and Plan     Diagnoses and all orders for this visit:    1. Thyroid disorder (Primary)  -     TSH; Future    2. Tinnitus of both ears    3. Elevated cholesterol  -     Lipid Panel With / Chol / HDL Ratio; Future  -     Comprehensive Metabolic Panel; Future    4. Vitamin D deficiency  -     Vitamin D,25-Hydroxy; Future    5. Abnormal CBC  -     CBC (No Diff); Future              Follow Up     Return in about 6 months (around 7/15/2024).    Patient was given instructions and counseling regarding her condition or for health maintenance advice. Please see specific information pulled into the AVS if appropriate.

## 2024-01-16 ENCOUNTER — TELEPHONE (OUTPATIENT)
Dept: GASTROENTEROLOGY | Facility: CLINIC | Age: 69
End: 2024-01-16
Payer: MEDICARE

## 2024-01-16 NOTE — TELEPHONE ENCOUNTER
"HUB UNABLE TO WARM TRANSFER.    Caller: Jessie Peck \"Madison\"    Relationship: Self    Best call back number: [phone]     What form or medical record are you requesting: BOWEL PREP INSTRUCTIONS     How would you like to receive the form or medical records (pick-up, mail, fax): PLEASE SEND TO PATIENT'S MYCHART.     Timeframe paperwork needed: BEFORE 12 PM TODAY     Additional notes: SCOPE IS SCHEDULED FOR TOMORROW.       "

## 2024-01-16 NOTE — TELEPHONE ENCOUNTER
"HUB UNABLE TO WARM TRANSFER.    Caller: Jessie Peck \"Madison\"    Relationship: Self    Best call back number: [phone]     What form or medical record are you requesting: BOWEL PREP INSTRUCTIONS     How would you like to receive the form or medical records (pick-up, mail, fax): PLEASE SEND TO PATIENT'S MYCHART.     Timeframe paperwork needed: BEFORE 12 PM TODAY     Additional notes: SCOPE IS SCHEDULED FOR TOMORROW.       "

## 2024-01-17 ENCOUNTER — ANESTHESIA EVENT (OUTPATIENT)
Dept: GASTROENTEROLOGY | Facility: HOSPITAL | Age: 69
End: 2024-01-17
Payer: MEDICARE

## 2024-01-17 ENCOUNTER — ANESTHESIA (OUTPATIENT)
Dept: GASTROENTEROLOGY | Facility: HOSPITAL | Age: 69
End: 2024-01-17
Payer: MEDICARE

## 2024-01-17 ENCOUNTER — HOSPITAL ENCOUNTER (OUTPATIENT)
Facility: HOSPITAL | Age: 69
Setting detail: HOSPITAL OUTPATIENT SURGERY
Discharge: HOME | End: 2024-01-17
Attending: INTERNAL MEDICINE | Admitting: INTERNAL MEDICINE
Payer: MEDICARE

## 2024-01-17 VITALS
BODY MASS INDEX: 24.15 KG/M2 | RESPIRATION RATE: 16 BRPM | SYSTOLIC BLOOD PRESSURE: 131 MMHG | WEIGHT: 150.3 LBS | DIASTOLIC BLOOD PRESSURE: 96 MMHG | HEIGHT: 66 IN | HEART RATE: 62 BPM | OXYGEN SATURATION: 99 %

## 2024-01-17 DIAGNOSIS — Z86.010 PERSONAL HISTORY OF COLONIC POLYPS: ICD-10-CM

## 2024-01-17 DIAGNOSIS — R19.4 CHANGE IN BOWEL HABITS: ICD-10-CM

## 2024-01-17 PROCEDURE — 25010000002 PROPOFOL 10 MG/ML EMULSION: Performed by: ANESTHESIOLOGY

## 2024-01-17 PROCEDURE — 88305 TISSUE EXAM BY PATHOLOGIST: CPT | Performed by: INTERNAL MEDICINE

## 2024-01-17 PROCEDURE — S0260 H&P FOR SURGERY: HCPCS | Performed by: INTERNAL MEDICINE

## 2024-01-17 PROCEDURE — 25810000003 LACTATED RINGERS PER 1000 ML: Performed by: INTERNAL MEDICINE

## 2024-01-17 RX ORDER — PROPOFOL 10 MG/ML
VIAL (ML) INTRAVENOUS AS NEEDED
Status: DISCONTINUED | OUTPATIENT
Start: 2024-01-17 | End: 2024-01-17 | Stop reason: SURG

## 2024-01-17 RX ORDER — SODIUM CHLORIDE, SODIUM LACTATE, POTASSIUM CHLORIDE, CALCIUM CHLORIDE 600; 310; 30; 20 MG/100ML; MG/100ML; MG/100ML; MG/100ML
30 INJECTION, SOLUTION INTRAVENOUS CONTINUOUS PRN
Status: DISCONTINUED | OUTPATIENT
Start: 2024-01-17 | End: 2024-01-17 | Stop reason: HOSPADM

## 2024-01-17 RX ADMIN — PROPOFOL 120 MCG/KG/MIN: 10 INJECTION, EMULSION INTRAVENOUS at 16:21

## 2024-01-17 RX ADMIN — PROPOFOL 120 MG: 10 INJECTION, EMULSION INTRAVENOUS at 16:20

## 2024-01-17 RX ADMIN — SODIUM CHLORIDE, POTASSIUM CHLORIDE, SODIUM LACTATE AND CALCIUM CHLORIDE 30 ML/HR: 600; 310; 30; 20 INJECTION, SOLUTION INTRAVENOUS at 14:24

## 2024-01-17 NOTE — DISCHARGE INSTRUCTIONS
For the rest of the day patient needs to be with a responsible adult.    For the rest of the day DO NOT drive, operate machinery, appliances, drink alcohol, make important decisions or sign legal documents.    Start with a light or bland diet if you are feeling sick to your stomach otherwise advance to regular diet as tolerated.    Follow recommendations on procedure report if provided by your doctor.    Call Dr Ponce for problems [phone]    Problems may include but not limited to: large amounts of bleeding, trouble breathing, repeated vomiting, severe unrelieved pain, fever or chills.

## 2024-01-17 NOTE — ANESTHESIA PREPROCEDURE EVALUATION
Anesthesia Evaluation     Patient summary reviewed and Nursing notes reviewed                Airway   Mallampati: II  TM distance: >3 FB  Dental      Pulmonary - negative pulmonary ROS   Cardiovascular     ECG reviewed  Rhythm: regular    (+) hyperlipidemia      Neuro/Psych  (+) psychiatric history Anxiety and Depression  GI/Hepatic/Renal/Endo    (+) thyroid problem hypothyroidism    Musculoskeletal (-) negative ROS    Abdominal    Substance History   (+) alcohol use     OB/GYN negative ob/gyn ROS         Other      history of cancer                  Anesthesia Plan    ASA 2     MAC     intravenous induction     Anesthetic plan, risks, benefits, and alternatives have been provided, discussed and informed consent has been obtained with: patient.    CODE STATUS:

## 2024-01-17 NOTE — BRIEF OP NOTE
COLONOSCOPY  Progress Note    Jessie Peck  1/17/2024    Pre-op Diagnosis:   Change in bowel habits [R19.4]  Personal history of colonic polyps [Z86.010]       Post-Op Diagnosis Codes:     * Change in bowel habits [R19.4]     * Personal history of colonic polyps [Z86.010]     * Diverticulosis [K57.90]     * Internal hemorrhoids [K64.8]     * Proctitis [K62.89]    Procedure/CPT® Codes:        Procedure(s):  COLONOSCOPY to cecum into terminal ileum with cold biopsies              Surgeon(s):  Omkar Ponce MD    Anesthesia: Monitored Anesthesia Care    Staff:   Endo Technician: Teri Yeh  Endo Nurse: Barb Molina RN         Estimated Blood Loss: minimal    Urine Voided: * No values recorded between 1/17/2024  4:12 PM and 1/17/2024  4:40 PM *    Specimens:                Specimens       ID Source Type Tests Collected By Collected At Frozen?    A Large Intestine, Left / Descending Colon Tissue TISSUE PATHOLOGY EXAM   Omkar Ponce MD 1/17/24 1637     Description: descending tissue biopsies    B Large Intestine, Rectum Tissue TISSUE PATHOLOGY EXAM   Omkar Ponce MD 1/17/24 1638     Description: reactal tissue biopsy    This specimen was not marked as sent.                  Drains: * No LDAs found *    Findings: Colonoscopy the terminal ileum with normal ileum mucosa.  Sigmoid diverticulosis and internal hemorrhoids were noted.  Changes of proctitis with inflammation and edema noted in the rectum biopsies of both the descending and rectum were obtained to rule out microscopic colitis.        Complications: None          Omkar Ponce MD     Date: 1/17/2024  Time: 16:41 EST

## 2024-01-17 NOTE — H&P
[facility]  Pre Procedure History & Physical    Chief Complaint:   Change in bowel habits    Subjective     HPI:   Patient 68-year-old female with history of hypothyroidism as well as hypothyroidism after ablation presenting with change in bowel habits.  Patient does have a history of colon polyps here for colonoscopy    Past Medical History:   Past Medical History:   Diagnosis Date    Anemia 1962    Hospitalized /transfusions    Cancer     Cataract     Surgery to remove November 2022    Ear fullness, bilateral     Ear itching     Hearing loss     Hyperthyroidism     Hypothyroidism 2000??    After Thyroid Ablation    Osteopenia     Skin cancer     Thyroid disorder     Tinnitus of both ears        Past Surgical History:  Past Surgical History:   Procedure Laterality Date    APPENDECTOMY  1967    COLONOSCOPY  2018    EYE SURGERY  Nov 2022    Cararacts removed       Family History:  Family History   Problem Relation Age of Onset    Cancer Mother         Cancer on a tumor at top of her stomach. Tumor removed. Cancer never came back.    Osteoporosis Mother     Arthritis Mother     Stroke Father     Heart disease Father         Triple by-pass; pace maker    Cancer Father         Squamous cell on lip. Cancer removed.    Skin cancer Father 80    Esophageal cancer Maternal Grandfather        Social History:   reports that she has never smoked. She has never used smokeless tobacco. She reports current alcohol use of about 4.0 - 6.0 standard drinks of alcohol per week. She reports that she does not use drugs.    Medications:   Medications Prior to Admission   Medication Sig Dispense Refill Last Dose    calcium carbonate (OS-YEYO) 1250 (500 Ca) MG tablet Calcium 500 500 mg calcium (1,250 mg) oral tablet take 1 tablet by oral route daily   Active   1/15/2024    levothyroxine (SYNTHROID, LEVOTHROID) 125 MCG tablet Take 1 tablet by mouth Every Morning. 90 tablet 1 1/16/2024    Diclofenac Sodium (VOLTAREN) 1  "% gel gel Apply 4 g topically to the appropriate area as directed 4 (Four) Times a Day. 100 g 5 More than a month       Allergies:  Patient has no known allergies.    ROS:    Pertinent items are noted in HPI     Objective     Blood pressure 139/84, pulse 58, resp. rate 10, height 167.6 cm (66\"), weight 68.2 kg (150 lb 4.8 oz), SpO2 98%.    Physical Exam   Constitutional: Pt is oriented to person, place, and time and well-developed, well-nourished, and in no distress.   Mouth/Throat: Oropharynx is clear and moist.   Neck: Normal range of motion.   Cardiovascular: Normal rate, regular rhythm and normal heart sounds.    Pulmonary/Chest: Effort normal and breath sounds normal.   Abdominal: Soft. Nontender  Skin: Skin is warm and dry.   Psychiatric: Mood, memory, affect and judgment normal.     Assessment & Plan     Diagnosis:  Change in bowel habits    Anticipated Surgical Procedure:  Colonoscopy    The risks, benefits, and alternatives of this procedure have been discussed with the patient or the responsible party- the patient understands and agrees to proceed.                                                          "

## 2024-01-17 NOTE — ANESTHESIA POSTPROCEDURE EVALUATION
"Patient: Jessie Peck    Procedure Summary       Date: 01/17/24 Room / Location:  [facility] /  [facility]    Anesthesia Start: 1612 Anesthesia Stop: 1645    Procedure: COLONOSCOPY to cecum into terminal ileum with cold biopsies Diagnosis:       Change in bowel habits      Personal history of colonic polyps      Diverticulosis      Internal hemorrhoids      Proctitis      (Change in bowel habits [R19.4])      (Personal history of colonic polyps [Z86.010])    Surgeons: Omkar Ponce MD Provider: Ilia De Leon MD    Anesthesia Type: MAC ASA Status: 2            Anesthesia Type: MAC    Vitals  Vitals Value Taken Time   /96 01/17/24 1703   Temp     Pulse 69 01/17/24 1707   Resp 16 01/17/24 1703   SpO2 98 % 01/17/24 1707   Vitals shown include unfiled device data.        Post Anesthesia Care and Evaluation    Pain management: adequate    Airway patency: patent  Anesthetic complications: No anesthetic complications    Cardiovascular status: acceptable  Respiratory status: acceptable  Hydration status: acceptable    Comments: /96 (BP Location: Left arm, Patient Position: Sitting)   Pulse 62   Resp 16   Ht 167.6 cm (66\")   Wt 68.2 kg (150 lb 4.8 oz)   SpO2 99%   BMI 24.26 kg/m²       "

## 2024-01-19 LAB
LAB AP CASE REPORT: NORMAL
PATH REPORT.FINAL DX SPEC: NORMAL
PATH REPORT.GROSS SPEC: NORMAL

## 2024-02-01 ENCOUNTER — TELEPHONE (OUTPATIENT)
Dept: GASTROENTEROLOGY | Facility: CLINIC | Age: 69
End: 2024-02-01
Payer: MEDICARE

## 2024-02-01 NOTE — TELEPHONE ENCOUNTER
"Hub staff attempted to follow warm transfer process and was unsuccessful     Caller: Jessie Peck \"Madison\"    Relationship to patient: Self    Best call back number: [phone]    Patient is needing: PATIENT RETURNED THE CALL FROM MARIELENA REGARDING TEST RESULTS. PLEASE CONTACT THE PATIENT BACK AND YOU CAN LEAVE A DETAILED VMAIL IF YOU CAN'T REACH HER.          "

## 2024-02-01 NOTE — TELEPHONE ENCOUNTER
Call to patient per Dr. Ponce's results and recommendations.   Patient verbalized understanding     Patient also wanted to know if a fiber diet would work as well. Or should she take the medication,or do both?

## 2024-02-01 NOTE — TELEPHONE ENCOUNTER
----- Message from Omkar Ponce MD sent at 1/31/2024  4:17 PM EST -----  Biopsies show normal colonic mucosa.  Give trial of Xifaxan 550 3 times daily for 14 days for IBS if not improved consider nortriptyline 10 mg at bedtime.

## 2024-02-08 ENCOUNTER — TRANSCRIBE ORDERS (OUTPATIENT)
Dept: ADMINISTRATIVE | Facility: HOSPITAL | Age: 69
End: 2024-02-08
Payer: MEDICARE

## 2024-02-08 DIAGNOSIS — Z12.31 VISIT FOR SCREENING MAMMOGRAM: Primary | ICD-10-CM

## 2024-02-12 DIAGNOSIS — E03.9 HYPOTHYROIDISM, UNSPECIFIED TYPE: ICD-10-CM

## 2024-02-12 RX ORDER — IBUPROFEN 200 MG
2 CAPSULE ORAL DAILY
Qty: 180 TABLET | Refills: 3 | Status: SHIPPED | OUTPATIENT
Start: 2024-02-12

## 2024-02-12 RX ORDER — LEVOTHYROXINE SODIUM 0.12 MG/1
125 TABLET ORAL
Qty: 90 TABLET | Refills: 1 | Status: SHIPPED | OUTPATIENT
Start: 2024-02-12

## 2024-03-18 ENCOUNTER — HOSPITAL ENCOUNTER (OUTPATIENT)
Dept: MAMMOGRAPHY | Facility: HOSPITAL | Age: 69
Discharge: HOME | End: 2024-03-18
Admitting: NURSE PRACTITIONER
Payer: MEDICARE

## 2024-03-18 DIAGNOSIS — Z12.31 VISIT FOR SCREENING MAMMOGRAM: ICD-10-CM

## 2024-03-18 PROCEDURE — 77067 SCR MAMMO BI INCL CAD: CPT

## 2024-03-18 PROCEDURE — 77063 BREAST TOMOSYNTHESIS BI: CPT

## 2024-05-16 ENCOUNTER — TELEPHONE (OUTPATIENT)
Dept: FAMILY MEDICINE CLINIC | Facility: CLINIC | Age: 69
End: 2024-05-16
Payer: MEDICARE

## 2024-05-16 DIAGNOSIS — Z78.0 POSTMENOPAUSE: ICD-10-CM

## 2024-05-16 NOTE — TELEPHONE ENCOUNTER
"    Caller: Jessie Peck \"Madison\"    Relationship to patient: Self    Best call back number: [phone]     Patient is needing: PATIENT STATES BARON IS TELLING HER THAT SHE IS DUE FOR A BONE DENSITY TEST AND SHE WOULD LIKE TO KNOW IF A REFERRAL IS GOING TO BE PUT IN FOR HER TO GET THIS DONE. PLEASE CALL TO CONFIRM.      "

## 2024-07-09 ENCOUNTER — HOSPITAL ENCOUNTER (OUTPATIENT)
Dept: BONE DENSITY | Facility: HOSPITAL | Age: 69
Discharge: HOME | End: 2024-07-09
Admitting: NURSE PRACTITIONER
Payer: MEDICARE

## 2024-07-09 DIAGNOSIS — Z78.0 POSTMENOPAUSE: ICD-10-CM

## 2024-07-09 PROCEDURE — 77080 DXA BONE DENSITY AXIAL: CPT

## 2024-07-10 ENCOUNTER — LAB (OUTPATIENT)
Dept: LAB | Facility: HOSPITAL | Age: 69
End: 2024-07-10
Payer: MEDICARE

## 2024-07-10 DIAGNOSIS — E07.9 THYROID DISORDER: ICD-10-CM

## 2024-07-10 DIAGNOSIS — E78.00 ELEVATED CHOLESTEROL: ICD-10-CM

## 2024-07-10 DIAGNOSIS — R79.89 ABNORMAL CBC: ICD-10-CM

## 2024-07-10 DIAGNOSIS — E55.9 VITAMIN D DEFICIENCY: ICD-10-CM

## 2024-07-10 LAB
25(OH)D3 SERPL-MCNC: 34.3 NG/ML (ref 30–100)
ALBUMIN SERPL-MCNC: 4.2 G/DL (ref 3.5–5.2)
ALBUMIN/GLOB SERPL: 2 G/DL
ALP SERPL-CCNC: 53 U/L (ref 39–117)
ALT SERPL W P-5'-P-CCNC: 8 U/L (ref 1–33)
ANION GAP SERPL CALCULATED.3IONS-SCNC: 7 MMOL/L (ref 5–15)
AST SERPL-CCNC: 14 U/L (ref 1–32)
BILIRUB SERPL-MCNC: 0.4 MG/DL (ref 0–1.2)
BUN SERPL-MCNC: 14 MG/DL (ref 8–23)
BUN/CREAT SERPL: 15.6 (ref 7–25)
CALCIUM SPEC-SCNC: 9.4 MG/DL (ref 8.6–10.5)
CHLORIDE SERPL-SCNC: 109 MMOL/L (ref 98–107)
CHOLEST SERPL-MCNC: 235 MG/DL (ref 0–200)
CO2 SERPL-SCNC: 28 MMOL/L (ref 22–29)
CREAT SERPL-MCNC: 0.9 MG/DL (ref 0.57–1)
DEPRECATED RDW RBC AUTO: 44.9 FL (ref 37–54)
EGFRCR SERPLBLD CKD-EPI 2021: 70.2 ML/MIN/1.73
ERYTHROCYTE [DISTWIDTH] IN BLOOD BY AUTOMATED COUNT: 13 % (ref 12.3–15.4)
GLOBULIN UR ELPH-MCNC: 2.1 GM/DL
GLUCOSE SERPL-MCNC: 90 MG/DL (ref 65–99)
HCT VFR BLD AUTO: 42.6 % (ref 34–46.6)
HDLC SERPL QL: 3.41
HDLC SERPL-MCNC: 69 MG/DL (ref 40–60)
HGB BLD-MCNC: 13.9 G/DL (ref 12–15.9)
LDLC SERPL CALC-MCNC: 145 MG/DL (ref 0–100)
MCH RBC QN AUTO: 30.7 PG (ref 26.6–33)
MCHC RBC AUTO-ENTMCNC: 32.6 G/DL (ref 31.5–35.7)
MCV RBC AUTO: 94 FL (ref 79–97)
PLATELET # BLD AUTO: 219 10*3/MM3 (ref 140–450)
PMV BLD AUTO: 10.3 FL (ref 6–12)
POTASSIUM SERPL-SCNC: 4.6 MMOL/L (ref 3.5–5.2)
PROT SERPL-MCNC: 6.3 G/DL (ref 6–8.5)
RBC # BLD AUTO: 4.53 10*6/MM3 (ref 3.77–5.28)
SODIUM SERPL-SCNC: 144 MMOL/L (ref 136–145)
TRIGL SERPL-MCNC: 121 MG/DL (ref 0–150)
TSH SERPL DL<=0.05 MIU/L-ACNC: 1.01 UIU/ML (ref 0.27–4.2)
VLDLC SERPL-MCNC: 21 MG/DL (ref 5–40)
WBC NRBC COR # BLD AUTO: 4.16 10*3/MM3 (ref 3.4–10.8)

## 2024-07-10 PROCEDURE — 80061 LIPID PANEL: CPT

## 2024-07-10 PROCEDURE — 85027 COMPLETE CBC AUTOMATED: CPT

## 2024-07-10 PROCEDURE — 84443 ASSAY THYROID STIM HORMONE: CPT

## 2024-07-10 PROCEDURE — 36415 COLL VENOUS BLD VENIPUNCTURE: CPT

## 2024-07-10 PROCEDURE — 82306 VITAMIN D 25 HYDROXY: CPT

## 2024-07-10 PROCEDURE — 80053 COMPREHEN METABOLIC PANEL: CPT

## 2024-07-12 ENCOUNTER — PATIENT MESSAGE (OUTPATIENT)
Dept: FAMILY MEDICINE CLINIC | Facility: CLINIC | Age: 69
End: 2024-07-12
Payer: MEDICARE

## 2024-07-13 NOTE — TELEPHONE ENCOUNTER
From: Jessie Peck  To: Percy Duke  Sent: 7/12/2024 7:38 AM EDT  Subject: Osteoporosis medication    I have a July 17 annual appointment with you. Can we discuss Infusions or other options besides Fosamax as an option for my Osteoporosis at that appointment? Thank you, Percy. See you soon.

## 2024-07-17 ENCOUNTER — OFFICE VISIT (OUTPATIENT)
Dept: FAMILY MEDICINE CLINIC | Facility: CLINIC | Age: 69
End: 2024-07-17
Payer: MEDICARE

## 2024-07-17 VITALS
TEMPERATURE: 97.6 F | RESPIRATION RATE: 18 BRPM | DIASTOLIC BLOOD PRESSURE: 80 MMHG | HEART RATE: 67 BPM | HEIGHT: 66 IN | BODY MASS INDEX: 24.04 KG/M2 | WEIGHT: 149.6 LBS | OXYGEN SATURATION: 98 % | SYSTOLIC BLOOD PRESSURE: 128 MMHG

## 2024-07-17 DIAGNOSIS — M81.0 AGE-RELATED OSTEOPOROSIS WITHOUT CURRENT PATHOLOGICAL FRACTURE: Primary | ICD-10-CM

## 2024-07-17 DIAGNOSIS — E07.9 THYROID DISORDER: ICD-10-CM

## 2024-07-17 DIAGNOSIS — L68.0 HIRSUTISM: ICD-10-CM

## 2024-07-17 DIAGNOSIS — E78.00 ELEVATED CHOLESTEROL: ICD-10-CM

## 2024-07-17 PROCEDURE — 1159F MED LIST DOCD IN RCRD: CPT | Performed by: NURSE PRACTITIONER

## 2024-07-17 PROCEDURE — 1170F FXNL STATUS ASSESSED: CPT | Performed by: NURSE PRACTITIONER

## 2024-07-17 PROCEDURE — 1126F AMNT PAIN NOTED NONE PRSNT: CPT | Performed by: NURSE PRACTITIONER

## 2024-07-17 PROCEDURE — G0439 PPPS, SUBSEQ VISIT: HCPCS | Performed by: NURSE PRACTITIONER

## 2024-07-17 PROCEDURE — 1160F RVW MEDS BY RX/DR IN RCRD: CPT | Performed by: NURSE PRACTITIONER

## 2024-07-17 NOTE — PROGRESS NOTES
Subjective   The ABCs of the Annual Wellness Visit  Medicare Wellness Visit      Jessie Peck is a 69 y.o. patient who presents for a Medicare Wellness Visit.    The following portions of the patient's history were reviewed and   updated as appropriate: allergies, current medications, past family history, past medical history, past social history, past surgical history, and problem list.    Compared to one year ago, the patient's physical   health is better.  Compared to one year ago, the patient's mental   health is better.    Recent Hospitalizations:  She was not admitted to the hospital during the last year.     Current Medical Providers:  Patient Care Team:  Percy Duke APRN as PCP - General (Nurse Practitioner)    Outpatient Medications Prior to Visit   Medication Sig Dispense Refill    calcium carbonate (OS-YEYO) 1250 (500 Ca) MG tablet Take 2 tablets by mouth Daily. (Patient taking differently: Take 2 tablets by mouth 2 (Two) Times a Day As Needed (osteoporosis).) 180 tablet 3    Diclofenac Sodium (VOLTAREN) 1 % gel gel Apply 4 g topically to the appropriate area as directed 4 (Four) Times a Day. 100 g 5    levothyroxine (SYNTHROID, LEVOTHROID) 125 MCG tablet Take 1 tablet by mouth Every Morning. 90 tablet 1    riFAXIMin (Xifaxan) 550 MG tablet Take 1 tablet by mouth Every 8 (Eight) Hours. (Patient not taking: Reported on 7/17/2024) 42 tablet 0     No facility-administered medications prior to visit.     No opioid medication identified on active medication list. I have reviewed chart for other potential  high risk medication/s and harmful drug interactions in the elderly.      Aspirin is not on active medication list.  Aspirin use is not indicated based on review of current medical condition/s. Risk of harm outweighs potential benefits.  .    Patient Active Problem List   Diagnosis    Osteopenia    Pruritus    Skin cancer    Thyroid disorder    Tinnitus of both ears    Elevated cholesterol    Anxiety     "Change in bowel habits    Personal history of colonic polyps    Age-related osteoporosis without current pathological fracture     Advance Care Planning (Click this link to access ACP Navigator)  Advance Directive is not on file.  ACP discussion was held with the patient during this visit. Patient has an advance directive (not in EMR), copy requested.        Objective   Vitals:    24 1107   BP: 128/80   BP Location: Right arm   Patient Position: Sitting   Cuff Size: Adult   Pulse: 67   Resp: 18   Temp: 97.6 °F (36.4 °C)   TempSrc: Temporal   SpO2: 98%   Weight: 67.9 kg (149 lb 9.6 oz)   Height: 167.6 cm (66\")   PainSc: 0-No pain       Estimated body mass index is 24.15 kg/m² as calculated from the following:    Height as of this encounter: 167.6 cm (66\").    Weight as of this encounter: 67.9 kg (149 lb 9.6 oz).    BMI is within normal parameters. No other follow-up for BMI required.        Does the patient have evidence of cognitive impairment? No  Lab Results   Component Value Date    TRIG 121 07/10/2024    HDL 69 (H) 07/10/2024     (H) 07/10/2024    VLDL 21 07/10/2024                                                                                                Health  Risk Assessment    Smoking Status:  Social History     Tobacco Use   Smoking Status Never    Passive exposure: Never   Smokeless Tobacco Never     Alcohol Consumption:  Social History     Substance and Sexual Activity   Alcohol Use Yes    Alcohol/week: 4.0 - 6.0 standard drinks of alcohol    Types: 4 - 6 Glasses of wine per week    Comment: 1-4 DRINKS PER WEEK     Fall Risk Screen:  STEADI Fall Risk Assessment was completed, and patient is at LOW risk for falls.Assessment completed on:2024    Depression Screenin/17/2024    11:14 AM   PHQ-2/PHQ-9 Depression Screening   Little Interest or Pleasure in Doing Things 0-->not at all   Feeling Down, Depressed or Hopeless 0-->not at all   PHQ-9: Brief Depression Severity Measure " Score 0     Health Habits and Functional and Cognitive Screenin/17/2024    11:12 AM   Functional & Cognitive Status   Do you have difficulty preparing food and eating? No   Do you have difficulty bathing yourself, getting dressed or grooming yourself? No   Do you have difficulty using the toilet? No   Do you have difficulty moving around from place to place? No   Do you have trouble with steps or getting out of a bed or a chair? No   Current Diet Well Balanced Diet   Dental Exam Up to date   Eye Exam Up to date   Exercise (times per week) 3 times per week   Current Exercises Include Walking   Do you need help using the phone?  No   Are you deaf or do you have serious difficulty hearing?  No   Do you need help to go to places out of walking distance? No   Do you need help shopping? No   Do you need help preparing meals?  No   Do you need help with housework?  No   Do you need help with laundry? No   Do you need help taking your medications? No   Do you need help managing money? No   Do you ever drive or ride in a car without wearing a seat belt? No   Have you felt unusual stress, anger or loneliness in the last month? No   Who do you live with? Spouse   If you need help, do you have trouble finding someone available to you? No   Have you been bothered in the last four weeks by sexual problems? No   Do you have difficulty concentrating, remembering or making decisions? Yes             Age-appropriate Screening Schedule:  Refer to the list below for future screening recommendations based on patient's age, sex and/or medical conditions. Orders for these recommended tests are listed in the plan section. The patient has been provided with a written plan.    Health Maintenance List  Health Maintenance   Topic Date Due    COVID-19 Vaccine ( season) 10/01/2024 (Originally 3/6/2024)    INFLUENZA VACCINE  2024    LIPID PANEL  07/10/2025    ANNUAL WELLNESS VISIT  2025    MAMMOGRAM  2026     "DXA SCAN  07/09/2026    TDAP/TD VACCINES (2 - Td or Tdap) 08/30/2028    COLORECTAL CANCER SCREENING  01/17/2034    HEPATITIS C SCREENING  Completed    Pneumococcal Vaccine 65+  Completed    ZOSTER VACCINE  Completed    Hepatitis B  Aged Out                                                                                                                                                CMS Preventative Services Quick Reference  Risk Factors Identified During Encounter  None Identified  Gait is steady in the office not needing to walk with assistance.    The above risks/problems have been discussed with the patient.  Pertinent information has been shared with the patient in the After Visit Summary.  An After Visit Summary and PPPS were made available to the patient.    Follow Up:   Next Medicare Wellness visit to be scheduled in 1 year.         Additional E&M Note during same encounter follows:  Patient has additional, significant, and separately identifiable condition(s)/problem(s) that require work above and beyond the Medicare Wellness Visit     Chief Complaint  Hypothyroidism, Medicare Wellness-subsequent, and Osteoporosis (Discuss options)    Subjective   Osteoporosis:  Would like to to discuss fosamax.    Hypothyroidism:  Doing well on medication     Hypothyroidism  Pertinent negatives include no chest pain, coughing, fever or headaches.   Osteoporosis  Pertinent negatives include no chest pain, coughing, fever or headaches.         Review of Systems   Constitutional:  Negative for fever.   Respiratory:  Negative for cough, chest tightness and shortness of breath.    Cardiovascular:  Negative for chest pain.              Objective   Vital Signs:  /80 (BP Location: Right arm, Patient Position: Sitting, Cuff Size: Adult)   Pulse 67   Temp 97.6 °F (36.4 °C) (Temporal)   Resp 18   Ht 167.6 cm (66\")   Wt 67.9 kg (149 lb 9.6 oz)   SpO2 98%   BMI 24.15 kg/m²   Physical Exam  Vitals reviewed.   Constitutional:  "      Appearance: Normal appearance. She is well-developed.   HENT:      Head: Normocephalic and atraumatic.      Mouth/Throat:      Pharynx: No oropharyngeal exudate.   Eyes:      Conjunctiva/sclera: Conjunctivae normal.      Pupils: Pupils are equal, round, and reactive to light.   Cardiovascular:      Rate and Rhythm: Normal rate and regular rhythm.      Heart sounds: Normal heart sounds. No murmur heard.     No friction rub. No gallop.   Pulmonary:      Effort: Pulmonary effort is normal.      Breath sounds: Normal breath sounds. No wheezing or rhonchi.   Skin:     General: Skin is warm and dry.   Neurological:      Mental Status: She is alert and oriented to person, place, and time.   Psychiatric:         Mood and Affect: Mood and affect normal.         Behavior: Behavior normal.         Thought Content: Thought content normal.         Judgment: Judgment normal.                 Assessment and Plan               Age-related osteoporosis without current pathological fracture    Hirsutism    Elevated cholesterol   Lipid abnormalities are stable    Plan:  Lipid lowering therapy not prescribed due to    The 10-year ASCVD risk score (Chong DK, et al., 2019) is: 6.9%    Values used to calculate the score:      Age: 69 years      Sex: Female      Is Non- : No      Diabetic: No      Tobacco smoker: No      Systolic Blood Pressure: 128 mmHg      Is BP treated: No      HDL Cholesterol: 69 mg/dL      Total Cholesterol: 235 mg/dL        Patient Treatment Goals:   LDL goal is under 100    Followup in 6 months.  Thyroid disorder      Orders Placed This Encounter   Procedures    TSH     Standing Status:   Future     Standing Expiration Date:   7/17/2025     Order Specific Question:   Release to patient     Answer:   Routine Release [[id number]]    Lipid Panel With / Chol / HDL Ratio     Standing Status:   Future     Standing Expiration Date:   7/17/2025     Order Specific Question:   Release to patient      Answer:   Routine Release [[id number]]    Comprehensive Metabolic Panel     Standing Status:   Future     Standing Expiration Date:   7/17/2025     Order Specific Question:   Release to patient     Answer:   Routine Release [[id number]]    CBC (No Diff)     Standing Status:   Future     Standing Expiration Date:   7/17/2025     Order Specific Question:   Release to patient     Answer:   Routine Release [[id number]]    Urinalysis With Culture If Indicated -     Standing Status:   Future     Standing Expiration Date:   7/17/2025     Order Specific Question:   Release to patient     Answer:   Routine Release [[id number]]     New Medications Ordered This Visit   Medications    estrogen, conjugated,-medroxyprogesterone (PREMPRO) 0.3-1.5 MG per tablet     Sig: Take 1 tablet by mouth Daily.     Dispense:  90 tablet     Refill:  3          Follow Up   Return in about 6 months (around 1/17/2025).  Patient was given instructions and counseling regarding her condition or for health maintenance advice. Please see specific information pulled into the AVS if appropriate.

## 2024-07-17 NOTE — ASSESSMENT & PLAN NOTE
Lipid abnormalities are stable    Plan:  Lipid lowering therapy not prescribed due to    The 10-year ASCVD risk score (Chong GAMBOA, et al., 2019) is: 6.9%    Values used to calculate the score:      Age: 69 years      Sex: Female      Is Non- : No      Diabetic: No      Tobacco smoker: No      Systolic Blood Pressure: 128 mmHg      Is BP treated: No      HDL Cholesterol: 69 mg/dL      Total Cholesterol: 235 mg/dL        Patient Treatment Goals:   LDL goal is under 100    Followup in 6 months.

## 2024-08-01 DIAGNOSIS — E03.9 HYPOTHYROIDISM, UNSPECIFIED TYPE: ICD-10-CM

## 2024-08-01 RX ORDER — LEVOTHYROXINE SODIUM 0.12 MG/1
125 TABLET ORAL
Qty: 90 TABLET | Refills: 1 | Status: SHIPPED | OUTPATIENT
Start: 2024-08-01

## 2024-08-21 ENCOUNTER — APPOINTMENT (OUTPATIENT)
Dept: GENERAL RADIOLOGY | Facility: HOSPITAL | Age: 69
End: 2024-08-21
Payer: MEDICARE

## 2024-08-21 ENCOUNTER — APPOINTMENT (OUTPATIENT)
Dept: CT IMAGING | Facility: HOSPITAL | Age: 69
End: 2024-08-21
Payer: MEDICARE

## 2024-08-21 ENCOUNTER — HOSPITAL ENCOUNTER (OUTPATIENT)
Facility: HOSPITAL | Age: 69
Setting detail: OBSERVATION
LOS: 1 days | Discharge: HOME | End: 2024-08-23
Attending: EMERGENCY MEDICINE | Admitting: STUDENT IN AN ORGANIZED HEALTH CARE EDUCATION/TRAINING PROGRAM
Payer: MEDICARE

## 2024-08-21 DIAGNOSIS — S22.41XA CLOSED FRACTURE OF MULTIPLE RIBS OF RIGHT SIDE, INITIAL ENCOUNTER: ICD-10-CM

## 2024-08-21 DIAGNOSIS — K92.1 MELENA: ICD-10-CM

## 2024-08-21 DIAGNOSIS — R79.89 PRERENAL AZOTEMIA: ICD-10-CM

## 2024-08-21 DIAGNOSIS — K92.2 ACUTE UPPER GI BLEED: Primary | ICD-10-CM

## 2024-08-21 DIAGNOSIS — D62 ACUTE BLOOD LOSS ANEMIA: ICD-10-CM

## 2024-08-21 LAB
ABO GROUP BLD: NORMAL
ABO GROUP BLD: NORMAL
ALBUMIN SERPL-MCNC: 4.1 G/DL (ref 3.5–5.2)
ALBUMIN/GLOB SERPL: 1.8 G/DL
ALP SERPL-CCNC: 44 U/L (ref 39–117)
ALT SERPL W P-5'-P-CCNC: 7 U/L (ref 1–33)
ANION GAP SERPL CALCULATED.3IONS-SCNC: 9.7 MMOL/L (ref 5–15)
AST SERPL-CCNC: 14 U/L (ref 1–32)
BASOPHILS # BLD AUTO: 0.04 10*3/MM3 (ref 0–0.2)
BASOPHILS NFR BLD AUTO: 0.5 % (ref 0–1.5)
BILIRUB SERPL-MCNC: 0.3 MG/DL (ref 0–1.2)
BILIRUB UR QL STRIP: NEGATIVE
BLD GP AB SCN SERPL QL: NEGATIVE
BUN SERPL-MCNC: 43 MG/DL (ref 8–23)
BUN/CREAT SERPL: 47.3 (ref 7–25)
CALCIUM SPEC-SCNC: 8.7 MG/DL (ref 8.6–10.5)
CHLORIDE SERPL-SCNC: 105 MMOL/L (ref 98–107)
CLARITY UR: CLEAR
CO2 SERPL-SCNC: 24.3 MMOL/L (ref 22–29)
COLOR UR: YELLOW
CREAT SERPL-MCNC: 0.91 MG/DL (ref 0.57–1)
D-LACTATE SERPL-SCNC: 0.9 MMOL/L (ref 0.5–2)
DEPRECATED RDW RBC AUTO: 42.9 FL (ref 37–54)
EGFRCR SERPLBLD CKD-EPI 2021: 69.3 ML/MIN/1.73
EOSINOPHIL # BLD AUTO: 0.06 10*3/MM3 (ref 0–0.4)
EOSINOPHIL NFR BLD AUTO: 0.8 % (ref 0.3–6.2)
ERYTHROCYTE [DISTWIDTH] IN BLOOD BY AUTOMATED COUNT: 12.6 % (ref 12.3–15.4)
GLOBULIN UR ELPH-MCNC: 2.3 GM/DL
GLUCOSE BLDC GLUCOMTR-MCNC: 118 MG/DL (ref 70–99)
GLUCOSE BLDC GLUCOMTR-MCNC: 89 MG/DL (ref 70–99)
GLUCOSE BLDC GLUCOMTR-MCNC: 89 MG/DL (ref 70–99)
GLUCOSE SERPL-MCNC: 107 MG/DL (ref 65–99)
GLUCOSE UR STRIP-MCNC: NEGATIVE MG/DL
HCT VFR BLD AUTO: 26.7 % (ref 34–46.6)
HCT VFR BLD AUTO: 33.4 % (ref 34–46.6)
HEMOCCULT STL QL IA: NEGATIVE
HGB BLD-MCNC: 11.1 G/DL (ref 12–15.9)
HGB BLD-MCNC: 8.9 G/DL (ref 12–15.9)
HGB UR QL STRIP.AUTO: NEGATIVE
HOLD SPECIMEN: NORMAL
IMM GRANULOCYTES # BLD AUTO: 0.03 10*3/MM3 (ref 0–0.05)
IMM GRANULOCYTES NFR BLD AUTO: 0.4 % (ref 0–0.5)
INR PPP: 1.05 (ref 0.86–1.15)
KETONES UR QL STRIP: NEGATIVE
LEUKOCYTE ESTERASE UR QL STRIP.AUTO: NEGATIVE
LIPASE SERPL-CCNC: 55 U/L (ref 13–60)
LYMPHOCYTES # BLD AUTO: 2.13 10*3/MM3 (ref 0.7–3.1)
LYMPHOCYTES NFR BLD AUTO: 28.3 % (ref 19.6–45.3)
MCH RBC QN AUTO: 30.8 PG (ref 26.6–33)
MCHC RBC AUTO-ENTMCNC: 33.2 G/DL (ref 31.5–35.7)
MCV RBC AUTO: 92.8 FL (ref 79–97)
MONOCYTES # BLD AUTO: 0.48 10*3/MM3 (ref 0.1–0.9)
MONOCYTES NFR BLD AUTO: 6.4 % (ref 5–12)
NEUTROPHILS NFR BLD AUTO: 4.79 10*3/MM3 (ref 1.7–7)
NEUTROPHILS NFR BLD AUTO: 63.6 % (ref 42.7–76)
NITRITE UR QL STRIP: NEGATIVE
NRBC BLD AUTO-RTO: 0 /100 WBC (ref 0–0.2)
PH UR STRIP.AUTO: <=5 [PH] (ref 5–8)
PLATELET # BLD AUTO: 268 10*3/MM3 (ref 140–450)
PMV BLD AUTO: 10.3 FL (ref 6–12)
POTASSIUM SERPL-SCNC: 3.9 MMOL/L (ref 3.5–5.2)
PROT SERPL-MCNC: 6.4 G/DL (ref 6–8.5)
PROT UR QL STRIP: NEGATIVE
PROTHROMBIN TIME: 13.9 SECONDS (ref 11.8–14.9)
RBC # BLD AUTO: 3.6 10*6/MM3 (ref 3.77–5.28)
RH BLD: POSITIVE
RH BLD: POSITIVE
SODIUM SERPL-SCNC: 139 MMOL/L (ref 136–145)
SP GR UR STRIP: 1.02 (ref 1–1.03)
T&S EXPIRATION DATE: NORMAL
UROBILINOGEN UR QL STRIP: NORMAL
WBC NRBC COR # BLD AUTO: 7.53 10*3/MM3 (ref 3.4–10.8)
WHOLE BLOOD HOLD COAG: NORMAL
WHOLE BLOOD HOLD SPECIMEN: NORMAL
WHOLE BLOOD HOLD SPECIMEN: NORMAL

## 2024-08-21 PROCEDURE — 86900 BLOOD TYPING SEROLOGIC ABO: CPT | Performed by: STUDENT IN AN ORGANIZED HEALTH CARE EDUCATION/TRAINING PROGRAM

## 2024-08-21 PROCEDURE — 83690 ASSAY OF LIPASE: CPT

## 2024-08-21 PROCEDURE — 86901 BLOOD TYPING SEROLOGIC RH(D): CPT | Performed by: STUDENT IN AN ORGANIZED HEALTH CARE EDUCATION/TRAINING PROGRAM

## 2024-08-21 PROCEDURE — 71101 X-RAY EXAM UNILAT RIBS/CHEST: CPT

## 2024-08-21 PROCEDURE — 99291 CRITICAL CARE FIRST HOUR: CPT

## 2024-08-21 PROCEDURE — 85610 PROTHROMBIN TIME: CPT | Performed by: EMERGENCY MEDICINE

## 2024-08-21 PROCEDURE — 99223 1ST HOSP IP/OBS HIGH 75: CPT | Performed by: STUDENT IN AN ORGANIZED HEALTH CARE EDUCATION/TRAINING PROGRAM

## 2024-08-21 PROCEDURE — G0378 HOSPITAL OBSERVATION PER HR: HCPCS

## 2024-08-21 PROCEDURE — 82274 ASSAY TEST FOR BLOOD FECAL: CPT | Performed by: NURSE PRACTITIONER

## 2024-08-21 PROCEDURE — 96361 HYDRATE IV INFUSION ADD-ON: CPT

## 2024-08-21 PROCEDURE — 85014 HEMATOCRIT: CPT | Performed by: STUDENT IN AN ORGANIZED HEALTH CARE EDUCATION/TRAINING PROGRAM

## 2024-08-21 PROCEDURE — 25810000003 SODIUM CHLORIDE 0.9 % SOLUTION: Performed by: EMERGENCY MEDICINE

## 2024-08-21 PROCEDURE — 83605 ASSAY OF LACTIC ACID: CPT

## 2024-08-21 PROCEDURE — 99222 1ST HOSP IP/OBS MODERATE 55: CPT | Performed by: INTERNAL MEDICINE

## 2024-08-21 PROCEDURE — 25810000003 LACTATED RINGERS PER 1000 ML: Performed by: STUDENT IN AN ORGANIZED HEALTH CARE EDUCATION/TRAINING PROGRAM

## 2024-08-21 PROCEDURE — 86850 RBC ANTIBODY SCREEN: CPT | Performed by: STUDENT IN AN ORGANIZED HEALTH CARE EDUCATION/TRAINING PROGRAM

## 2024-08-21 PROCEDURE — 36415 COLL VENOUS BLD VENIPUNCTURE: CPT | Performed by: STUDENT IN AN ORGANIZED HEALTH CARE EDUCATION/TRAINING PROGRAM

## 2024-08-21 PROCEDURE — 74177 CT ABD & PELVIS W/CONTRAST: CPT

## 2024-08-21 PROCEDURE — 81003 URINALYSIS AUTO W/O SCOPE: CPT | Performed by: EMERGENCY MEDICINE

## 2024-08-21 PROCEDURE — 99285 EMERGENCY DEPT VISIT HI MDM: CPT

## 2024-08-21 PROCEDURE — 86901 BLOOD TYPING SEROLOGIC RH(D): CPT

## 2024-08-21 PROCEDURE — 85025 COMPLETE CBC W/AUTO DIFF WBC: CPT

## 2024-08-21 PROCEDURE — 80053 COMPREHEN METABOLIC PANEL: CPT

## 2024-08-21 PROCEDURE — 82948 REAGENT STRIP/BLOOD GLUCOSE: CPT | Performed by: STUDENT IN AN ORGANIZED HEALTH CARE EDUCATION/TRAINING PROGRAM

## 2024-08-21 PROCEDURE — 96374 THER/PROPH/DIAG INJ IV PUSH: CPT

## 2024-08-21 PROCEDURE — 96376 TX/PRO/DX INJ SAME DRUG ADON: CPT

## 2024-08-21 PROCEDURE — 85018 HEMOGLOBIN: CPT | Performed by: STUDENT IN AN ORGANIZED HEALTH CARE EDUCATION/TRAINING PROGRAM

## 2024-08-21 PROCEDURE — 82948 REAGENT STRIP/BLOOD GLUCOSE: CPT

## 2024-08-21 PROCEDURE — 86900 BLOOD TYPING SEROLOGIC ABO: CPT

## 2024-08-21 PROCEDURE — 25510000001 IOPAMIDOL PER 1 ML: Performed by: EMERGENCY MEDICINE

## 2024-08-21 RX ORDER — POLYETHYLENE GLYCOL 3350 17 G/17G
17 POWDER, FOR SOLUTION ORAL DAILY PRN
Status: DISCONTINUED | OUTPATIENT
Start: 2024-08-21 | End: 2024-08-23 | Stop reason: HOSPADM

## 2024-08-21 RX ORDER — PANTOPRAZOLE SODIUM 40 MG/10ML
40 INJECTION, POWDER, LYOPHILIZED, FOR SOLUTION INTRAVENOUS EVERY 12 HOURS SCHEDULED
Status: DISCONTINUED | OUTPATIENT
Start: 2024-08-21 | End: 2024-08-23 | Stop reason: HOSPADM

## 2024-08-21 RX ORDER — DEXTROSE MONOHYDRATE 25 G/50ML
25 INJECTION, SOLUTION INTRAVENOUS
Status: DISCONTINUED | OUTPATIENT
Start: 2024-08-21 | End: 2024-08-23 | Stop reason: HOSPADM

## 2024-08-21 RX ORDER — NICOTINE POLACRILEX 4 MG
15 LOZENGE BUCCAL
Status: DISCONTINUED | OUTPATIENT
Start: 2024-08-21 | End: 2024-08-23 | Stop reason: HOSPADM

## 2024-08-21 RX ORDER — LIDOCAINE 4 G/G
1 PATCH TOPICAL
Status: DISCONTINUED | OUTPATIENT
Start: 2024-08-21 | End: 2024-08-23 | Stop reason: HOSPADM

## 2024-08-21 RX ORDER — AMOXICILLIN 250 MG
2 CAPSULE ORAL 2 TIMES DAILY PRN
Status: DISCONTINUED | OUTPATIENT
Start: 2024-08-21 | End: 2024-08-23 | Stop reason: HOSPADM

## 2024-08-21 RX ORDER — BISACODYL 10 MG
10 SUPPOSITORY, RECTAL RECTAL DAILY PRN
Status: DISCONTINUED | OUTPATIENT
Start: 2024-08-21 | End: 2024-08-23 | Stop reason: HOSPADM

## 2024-08-21 RX ORDER — MORPHINE SULFATE 2 MG/ML
1 INJECTION, SOLUTION INTRAMUSCULAR; INTRAVENOUS EVERY 4 HOURS PRN
Status: DISCONTINUED | OUTPATIENT
Start: 2024-08-21 | End: 2024-08-23 | Stop reason: HOSPADM

## 2024-08-21 RX ORDER — SODIUM CHLORIDE 9 MG/ML
40 INJECTION, SOLUTION INTRAVENOUS AS NEEDED
Status: DISCONTINUED | OUTPATIENT
Start: 2024-08-21 | End: 2024-08-23 | Stop reason: HOSPADM

## 2024-08-21 RX ORDER — IOPAMIDOL 755 MG/ML
100 INJECTION, SOLUTION INTRAVASCULAR
Status: COMPLETED | OUTPATIENT
Start: 2024-08-21 | End: 2024-08-21

## 2024-08-21 RX ORDER — NALOXONE HCL 0.4 MG/ML
0.4 VIAL (ML) INJECTION
Status: DISCONTINUED | OUTPATIENT
Start: 2024-08-21 | End: 2024-08-23 | Stop reason: HOSPADM

## 2024-08-21 RX ORDER — SODIUM CHLORIDE 0.9 % (FLUSH) 0.9 %
10 SYRINGE (ML) INJECTION AS NEEDED
Status: DISCONTINUED | OUTPATIENT
Start: 2024-08-21 | End: 2024-08-23 | Stop reason: HOSPADM

## 2024-08-21 RX ORDER — IBUPROFEN 600 MG/1
1 TABLET ORAL
Status: DISCONTINUED | OUTPATIENT
Start: 2024-08-21 | End: 2024-08-23 | Stop reason: HOSPADM

## 2024-08-21 RX ORDER — TRAMADOL HYDROCHLORIDE 50 MG/1
50 TABLET ORAL EVERY 6 HOURS PRN
Status: DISCONTINUED | OUTPATIENT
Start: 2024-08-21 | End: 2024-08-23 | Stop reason: HOSPADM

## 2024-08-21 RX ORDER — NITROGLYCERIN 0.4 MG/1
0.4 TABLET SUBLINGUAL
Status: DISCONTINUED | OUTPATIENT
Start: 2024-08-21 | End: 2024-08-23 | Stop reason: HOSPADM

## 2024-08-21 RX ORDER — MULTIPLE VITAMINS W/ MINERALS TAB 9MG-400MCG
1 TAB ORAL DAILY
COMMUNITY

## 2024-08-21 RX ORDER — SODIUM CHLORIDE, SODIUM LACTATE, POTASSIUM CHLORIDE, CALCIUM CHLORIDE 600; 310; 30; 20 MG/100ML; MG/100ML; MG/100ML; MG/100ML
100 INJECTION, SOLUTION INTRAVENOUS CONTINUOUS
Status: ACTIVE | OUTPATIENT
Start: 2024-08-21 | End: 2024-08-21

## 2024-08-21 RX ORDER — PANTOPRAZOLE SODIUM 40 MG/10ML
80 INJECTION, POWDER, LYOPHILIZED, FOR SOLUTION INTRAVENOUS ONCE
Status: COMPLETED | OUTPATIENT
Start: 2024-08-21 | End: 2024-08-21

## 2024-08-21 RX ORDER — SODIUM CHLORIDE 0.9 % (FLUSH) 0.9 %
10 SYRINGE (ML) INJECTION EVERY 12 HOURS SCHEDULED
Status: DISCONTINUED | OUTPATIENT
Start: 2024-08-21 | End: 2024-08-23 | Stop reason: HOSPADM

## 2024-08-21 RX ORDER — BISACODYL 5 MG/1
5 TABLET, DELAYED RELEASE ORAL DAILY PRN
Status: DISCONTINUED | OUTPATIENT
Start: 2024-08-21 | End: 2024-08-23 | Stop reason: HOSPADM

## 2024-08-21 RX ORDER — ACETAMINOPHEN 325 MG/1
650 TABLET ORAL EVERY 4 HOURS PRN
Status: DISCONTINUED | OUTPATIENT
Start: 2024-08-21 | End: 2024-08-23 | Stop reason: HOSPADM

## 2024-08-21 RX ADMIN — PANTOPRAZOLE SODIUM 80 MG: 40 INJECTION, POWDER, FOR SOLUTION INTRAVENOUS at 07:29

## 2024-08-21 RX ADMIN — SODIUM CHLORIDE, POTASSIUM CHLORIDE, SODIUM LACTATE AND CALCIUM CHLORIDE 100 ML/HR: 600; 310; 30; 20 INJECTION, SOLUTION INTRAVENOUS at 19:46

## 2024-08-21 RX ADMIN — Medication 10 ML: at 21:15

## 2024-08-21 RX ADMIN — SODIUM CHLORIDE, POTASSIUM CHLORIDE, SODIUM LACTATE AND CALCIUM CHLORIDE 100 ML/HR: 600; 310; 30; 20 INJECTION, SOLUTION INTRAVENOUS at 10:56

## 2024-08-21 RX ADMIN — Medication 10 ML: at 10:56

## 2024-08-21 RX ADMIN — SODIUM CHLORIDE 1000 ML: 9 INJECTION, SOLUTION INTRAVENOUS at 07:27

## 2024-08-21 RX ADMIN — PANTOPRAZOLE SODIUM 40 MG: 40 INJECTION, POWDER, FOR SOLUTION INTRAVENOUS at 21:14

## 2024-08-21 RX ADMIN — IOPAMIDOL 100 ML: 755 INJECTION, SOLUTION INTRAVENOUS at 07:21

## 2024-08-21 NOTE — CONSULTS
[facility]  Initial Inpatient Consult Note    Referring Provider: Pablo Castro MD    Reason for Consultation: Melena,     History of present illness:    Patient is 69 y.o. female with known history of hypothyroidism s/p ablation of thyroid, hyperlipidemia, diverticulosis of the sigmoid colon and internal hemorrhoids admitted with right eighth and ninth rib fracture after a fall from broken chair about a week ago and since then she is taking ibuprofen 800 mg 3 times daily to control the pain.  On Monday, she was trying to  a Box from a local storage and had excruciating pain in the right lateral rib region and felt nauseated and vomited darkish brown vomitus.  Yesterday, around 5 PM she had black tarry BM.  Since then, no BM.  She denies nausea or vomiting at present.  Patient does not take any blood thinner or anticoagulant medications.    In the ED, hemoglobin reported 11.1 g/dL from 13.9 g/dL about a month ago.  BUN 43 and creatinine 0.91.  Electrolytes within normal range.  LFTs within normal range.  FOBT negative.    Past Medical History:  Past Medical History:   Diagnosis Date    Anemia 1962    Hospitalized /transfusions    Cancer     Cataract     Surgery to remove November 2022    Ear fullness, bilateral     Ear itching     Hearing loss     Hyperthyroidism     Hypothyroidism 2000??    After Thyroid Ablation    Osteopenia     Skin cancer     Thyroid disorder     Tinnitus of both ears      Past Surgical History:  Past Surgical History:   Procedure Laterality Date    APPENDECTOMY  1967    COLONOSCOPY  2018    COLONOSCOPY N/A 1/17/2024    Procedure: COLONOSCOPY to cecum into terminal ileum with cold biopsies;  Surgeon: Omkar Ponce MD;  Location: [facility] ENDOSCOPY;  Service: Gastroenterology;  Laterality: N/A;  pre: change in bowel habits  post: diverticulosis, proctitis, hemorrhoids    EYE SURGERY  Nov 2022    Cararacts removed      Social History:   Social History      Tobacco Use    Smoking status: Never     Passive exposure: Never    Smokeless tobacco: Never   Substance Use Topics    Alcohol use: Yes     Alcohol/week: 4.0 - 6.0 standard drinks of alcohol     Types: 4 - 6 Glasses of wine per week     Comment: 1-4 DRINKS PER WEEK      Family History:  Family History   Problem Relation Age of Onset    Cancer Mother         Cancer on a tumor at top of her stomach. Tumor removed. Cancer never came back.    Osteoporosis Mother     Arthritis Mother     Stroke Father     Heart disease Father         Triple by-pass; pace maker    Cancer Father         Squamous cell on lip. Cancer removed.    Skin cancer Father 80    Esophageal cancer Maternal Grandfather      Home Meds:  (Not in a hospital admission)    Current Meds:   Lidocaine, 1 patch, Transdermal, Q24H  pantoprazole, 40 mg, Intravenous, Q12H  sodium chloride, 10 mL, Intravenous, Q12H      Allergies:  No Known Allergies    Objective     Vital Signs  Temp:  [97.8 °F (36.6 °C)] 97.8 °F (36.6 °C)  Heart Rate:  [70] 70  Resp:  [16] 16  BP: (135)/(88) 135/88  Physical Exam:  General Appearance:    Alert and oriented, normal affect   Head:    Normocephalic, without obvious abnormality, atraumatic   Eyes:          conjunctivae and sclerae normal, no icterus, pupils round and reactive to light   Oral cavity: Moist and intact, normal dentation   Neck:   Supple, no adenopathy   Chest Wall/Lungs:    No abnormalities observed, equal on expansion bilaterally, no use of extrarespiratory muscles noted   Abdomen:     Soft, nondistended, nontender; normal bowel sounds, no hepatospleenomegaly, no ascites.  Rectal examination performed in presence of chaperone, darkish brown with grayish tinge in the stool smear   Extremities:   no edema, clubbing, or cyanosis   Skin:   No bruising or rash   Psychiatric:  normal mood and insight     Results Review:   I reviewed the patient's new clinical results.    Results from last 7 days   Lab Units  08/21/24  0448   WBC 10*3/mm3 7.53   HEMOGLOBIN g/dL 11.1*   MCV fL 92.8   PLATELETS 10*3/mm3 268         Lab 08/21/24  0448   NEUTROS ABS 4.79     Results from last 7 days   Lab Units 08/21/24  0448   BUN mg/dL 43*   CREATININE mg/dL 0.91   SODIUM mmol/L 139   POTASSIUM mmol/L 3.9   CHLORIDE mmol/L 105   CO2 mmol/L 24.3   GLUCOSE mg/dL 107*      Results from last 7 days   Lab Units 08/21/24  0448   PROTIME Seconds 13.9   INR  1.05     Results from last 7 days   Lab Units 08/21/24  0448   AST (SGOT) U/L 14   ALT (SGPT) U/L 7   ALK PHOS U/L 44   BILIRUBIN mg/dL 0.3   TOTAL PROTEIN g/dL 6.4   ALBUMIN g/dL 4.1                Radiology:  CT Abdomen Pelvis With Contrast    Result Date: 8/21/2024  1. Right-sided rib fractures are noted without evidence of definite pneumothorax or significant pleural fluid collections. 2. Low-attenuation lesions within the liver are similar to the prior study and compatible with cysts. There is no evidence of an acute traumatic abnormality of the liver or other solid organs within the right upper quadrant 3. Incidental note of cystic lesion within the uncinate process of the pancreas. Findings may represent dilation of the pancreatic duct in this region or other cystic lesions. Consider follow-up on a nonemergent basis with a MRI of the abdomen with contrast. 4. There is mild prominence of the endometrial canal. In the absence of hormone replacement therapy this is likely abnormal in this age group and follow-up with a pelvic ultrasound on a nonemergent basis is recommended 5. 5 mm noncalcified nodule at the left lung base. Optional follow-up at 12 months and at high risk patient recommended. 6. Other findings as above Electronically Signed: Akash Cotto MD  8/21/2024 7:40 AM EDT  Workstation ID: [id number]    XR Ribs Right With PA Chest    Result Date: 8/21/2024  Right lateral eighth and ninth rib fractures with no pneumothorax. Otherwise negative. Electronically Signed: Paul Montilla  MD  8/21/2024 5:23 AM EDT  Workstation ID: [id number]     Impression:     Melena     Impression:    1.  An episode of melena  2.  Prerenal azotemia  3.  Ibuprofen heavy dose intake  4.  Right lateral ninth and eighth rib fractures    Plan and Recommendations: Patient with eighth and ninth rib fracture was taking high dosage of ibuprofen for last 5 days and had an episode of melena last evening.  She had nausea and vomiting with darkish brown vomitus that been resolved.  Patient found to be FOBT negative. She does have prerenal azotemia and a drop in hemoglobin around 3 g/dL.  There is possibility of NSAIDs induced gastric ulceration versus gastritis.  Would recommend no NSAIDs, IV hydration, pain control, Protonix 40 mg IV twice daily, clear liquid diet now and n.p.o. at midnight.  Will do EGD in a.m.    I discussed the patients findings and my recommendations with patient, nursing staff, primary care team, and consulting provider.      Electronically signed by Ferdinand Paz MD, 08/21/24, 10:34 AM EDT.

## 2024-08-21 NOTE — H&P
[facility] HISTORY AND PHYSICAL  Date: 2024   Patient Name: Jessie Peck  : 1955  MRN: [id number]  Primary Care Physician:  Percy Duke APRN  Date of admission: 2024    Subjective   Subjective     Chief Complaint: Black tarry stools, fall, right rib cage pain    HPI:    Jessie Peck is a 69 y.o. female with a past medical history of hypothyroidism, hyperlipidemia, internal hemorrhoids, diverticulosis of the sigmoid colon presented to the ED for evaluation of right-sided chest wall injury, black-colored stools.  Approximately a week ago patient was sitting in a chair that broke and she fell onto her right side hitting onto the right posterior lateral rib region.  Since then patient has been having pain in that region and has been taking ibuprofen 800 mg thrice a day for the past 5 days for pain control.  Yesterday patient was trying to pick something from the storage and she felt excruciating pain in her right lateral hip region and after that patient noticed having nausea and dizziness, had an episode of vomiting and noticed slight black-colored vomitus.  Later in the evening had an episode of tarry black-colored stools.  Denies any similar episodes in the past.  Not on iron pills.  Occasional drinks alcohol.  Denies any smoking.  Prior to this episode intermittently she used to use ibuprofen as needed.  Denies any fevers, chills, shortness of breath.    In the ED, vital signs temperature 97.8, pulse 70, respiratory 16, blood pressure 135/88 on room air saturating at 100%.  Labs showed CMP with no significant findings, normal lactic acid, normal lipase, normal WBC, hemoglobin 11.1, hemoglobin A1c was 13.9, platelets 268 urinalysis is noninfectious.  Stool occult negative.  Chest x-ray showed right lateral eighth and ninth rib fractures with no pneumothorax.  CT abdomen pelvis with contrast showed low-attenuation lesions within the liver are similar to the prior study and  compatible with cyst.  There is no evidence of acute traumatic abnormality of the liver or other solid organs within the right upper quadrant.  Incidental note of cystic lesions within the uncinate process of the pancreas.  Finding may represent dilatation of the pancreatic duct in the region or other cystic lesions.  Consider follow-up on a nonemergent basis with MRI of the abdomen with contrast.  Noncalcified nodule on the left lung base.  Received IV fluids and IV Protonix in the ED.  Case has been discussed by the ED physician with the gastroenterologist on-call Dr. Paz, recommended to keep the patient n.p.o. and will be consulted.  Patient has been admitted for further evaluation and management of melena.    Personal History     Past Medical History:   Diagnosis Date    Anemia 1962    Hospitalized /transfusions    Cancer     Cataract     Surgery to remove November 2022    Ear fullness, bilateral     Ear itching     Hearing loss     Hyperthyroidism     Hypothyroidism 2000??    After Thyroid Ablation    Osteopenia     Skin cancer     Thyroid disorder     Tinnitus of both ears          Past Surgical History:   Procedure Laterality Date    APPENDECTOMY  1967    COLONOSCOPY  2018    COLONOSCOPY N/A 1/17/2024    Procedure: COLONOSCOPY to cecum into terminal ileum with cold biopsies;  Surgeon: Omkar Ponce MD;  Location: [facility] ENDOSCOPY;  Service: Gastroenterology;  Laterality: N/A;  pre: change in bowel habits  post: diverticulosis, proctitis, hemorrhoids    EYE SURGERY  Nov 2022    Cararacts removed         Family History   Problem Relation Age of Onset    Cancer Mother         Cancer on a tumor at top of her stomach. Tumor removed. Cancer never came back.    Osteoporosis Mother     Arthritis Mother     Stroke Father     Heart disease Father         Triple by-pass; pace maker    Cancer Father         Squamous cell on lip. Cancer removed.    Skin cancer Father 80    Esophageal cancer Maternal Grandfather           Social History     Socioeconomic History    Marital status:    Tobacco Use    Smoking status: Never     Passive exposure: Never    Smokeless tobacco: Never   Vaping Use    Vaping status: Never Used   Substance and Sexual Activity    Alcohol use: Yes     Alcohol/week: 4.0 - 6.0 standard drinks of alcohol     Types: 4 - 6 Glasses of wine per week     Comment: 1-4 DRINKS PER WEEK    Drug use: Never    Sexual activity: Yes     Partners: Male     Birth control/protection: Post-menopausal         Home Medications:  Diclofenac Sodium, calcium carbonate, estrogen (conjugated)-medroxyprogesterone, levothyroxine, and riFAXIMin    Allergies:  No Known Allergies    Review of Systems   All other systems reviewed and negative except as mentioned above in the HPI    Objective   Objective     Vitals:   Temp:  [97.8 °F (36.6 °C)] 97.8 °F (36.6 °C)  Heart Rate:  [70] 70  Resp:  [16] 16  BP: (135)/(88) 135/88    Physical Exam    Constitutional: Awake, alert, no acute distress   Eyes: Pupils equal, sclerae anicteric, no conjunctival injection   HENT: NCAT, mucous membranes moist   Respiratory: Clear to auscultation bilaterally, nonlabored respirations, small skin ecchymosis noted on the right posterior lateral rib cage, mild tenderness to palpation   Cardiovascular: RRR, no murmurs   Gastrointestinal: soft, nontender, nondistended   Musculoskeletal: No bilateral ankle edema, no clubbing or cyanosis to extremities   Neurologic: Oriented x 3, speech clear    Result Review    Result Review:  I have personally reviewed the results from the time of this admission to 8/21/2024 08:58 EDT and agree with these findings:  [x]  Laboratory  []  Microbiology  [x]  Radiology  []  EKG/Telemetry   []  Cardiology/Vascular   []  Pathology  []  Old records  []  Other:        Imaging Results (Last 24 Hours)       Procedure Component Value Units Date/Time    CT Abdomen Pelvis With Contrast [[id number]] Collected: 08/21/24 4085     Updated:  08/21/24 0742    Narrative:      CT ABDOMEN PELVIS W CONTRAST    Date of Exam: 8/21/2024 7:12 AM EDT    Indication: eval epigasric abd pain, GI bleeding, R rib fx (eval liver lac).    Comparison: None available.    Technique: Axial CT images were obtained of the abdomen and pelvis after the uneventful intravenous administration of iodinated contrast. Reconstructed coronal and sagittal images were also obtained. Automated exposure control and iterative construction   methods were used.        FINDINGS:    Abdomen/Pelvis:    Lower Chest: Transitional vertebra at L1 with small tiny riblets noted.    Acute fracture of the ninth and 10th ribs laterally noted.    There is a stable subpleural nodule measuring 5 mm at the left costophrenic angle.    Minimal dependent atelectasis is noted. There is no significant pleural effusion or pneumothorax identified.    No free air is noted below the diaphragm.    Organs: Bilobed fluid density lesion at the periphery of the right hepatic lobe is stable from the comparison compatible with a cyst which does not change on delayed imaging. Additional small low-attenuation lesions within the liver are noted which also   likely represent cysts. There is no acute abnormality of the liver and no subhepatic fluid collections to suggest acute traumatic abnormality. The portal vein opacifies unremarkably. The gallbladder appears unremarkable    The right kidney demonstrates small low-attenuation lesion too small to characterize but likely a cyst. No evidence of acute traumatic abnormality noted. No significant change from the comparison.    Low-attenuation focus which may represent cyst or dilation of the pancreatic duct in the region of the uncinate process is again noted. Pancreas is otherwise grossly unremarkable in appearance. The spleen, left kidney and adrenal glands are grossly   unremarkable in their appearance    GI/Bowel: Stomach, small bowel demonstrate no acute abnormality. No  suspicious mesenteric adenopathy or fluid collection is noted. The colon demonstrates no acute abnormality. Cecum is displaced anteriorly. The ileocecal valve appears unremarkable. The   appendix is not well visualized but no inflammatory changes are noted.    Pelvis: Mild prominence of the pelvic vasculature greater on the left than the right is noted. Findings are nonspecific but can be seen with pelvic congestion syndrome. The ovaries are grossly unremarkable in appearance. Uterus is unremarkable. Mild   prominence of the endometrial canal is questioned. This would be abnormal in the absence of hormonal replacement therapy in this age group. Urinary bladder is unremarkable    Peritoneum/Retroperitoneum: Aorta is normal in caliber. There is no suspicious retroperitoneal adenopathy. Atherosclerotic changes of the aorta and its branches noted    Bones/Soft Tissues: Fractures of right-sided ribs as described above. Multilevel degenerative changes are noted of the spine greatest at L5-S1. Transitional vertebra at L1 as above. Soft tissue symmetry no definite acute abnormality.      Impression:        1. Right-sided rib fractures are noted without evidence of definite pneumothorax or significant pleural fluid collections.    2. Low-attenuation lesions within the liver are similar to the prior study and compatible with cysts. There is no evidence of an acute traumatic abnormality of the liver or other solid organs within the right upper quadrant    3. Incidental note of cystic lesion within the uncinate process of the pancreas. Findings may represent dilation of the pancreatic duct in this region or other cystic lesions. Consider follow-up on a nonemergent basis with a MRI of the abdomen with   contrast.    4. There is mild prominence of the endometrial canal. In the absence of hormone replacement therapy this is likely abnormal in this age group and follow-up with a pelvic ultrasound on a nonemergent basis is  recommended    5. 5 mm noncalcified nodule at the left lung base. Optional follow-up at 12 months and at high risk patient recommended.    6. Other findings as above          Electronically Signed: Akash Cotto MD    8/21/2024 7:40 AM EDT    Workstation ID: [id number]    XR Ribs Right With PA Chest [[id number]] Collected: 08/21/24 0519     Updated: 08/21/24 0525    Narrative:      XR RIBS RIGHT W PA CHEST    Date of Exam: 8/21/2024 5:05 AM EDT    Indication: Rib pain with shortness of air since fall 1 week ago.    Comparison: 10/10/2021    Findings:  Cardiac and mediastinal contours are normal. Pulmonary vascularity is normal. No pneumothorax is seen. The lungs are clear. There is degenerative disease at the right glenohumeral joint. There are right lateral eighth and ninth rib fractures.      Impression:      Right lateral eighth and ninth rib fractures with no pneumothorax. Otherwise negative.      Electronically Signed: Paul Montilla MD    8/21/2024 5:23 AM EDT    Workstation ID: [id number]             Lidocaine, 1 patch, Transdermal, Q24H         Assessment & Plan   Assessment / Plan     Assessment/Plan:   Mechanical fall  Right lateral eighth and ninth rib fractures  Melena  Likely upper GI bleed  Anemia likely due to upper GI bleed  Hypothyroidism  Hyperlipidemia    Plan  Admit to inpatient, telemetry  N.p.o.  IV Protonix 40 mg twice daily  LR at 100 cc/h for next 12 hours  GI consulted Dr. Paz, appreciate recommendations  Hypoglycemia protocol  Fall precautions  Pain control with IV analgesics, avoid NSAIDs  Lidocaine patch  Monitor hemoglobin with H and H every 12 hrs   Monitor renal function, electrolytes with a.m. labs    VTE Prophylaxis:  Mechanical VTE prophylaxis orders are signed & held.      CODE STATUS:    Level Of Support Discussed With: Patient  Code Status (Patient has no pulse and is not breathing): CPR (Attempt to Resuscitate)  Medical Interventions (Patient has pulse or is breathing): Full  Support      Admission Status:  I believe this patient meets inpatient status.    Part of this note may be an electronic transcription/translation of spoken language to printed text using the Dragon Dictation System    Gita Allen MD

## 2024-08-21 NOTE — CASE MANAGEMENT/SOCIAL WORK
"CC44 completed by  PA, Dr. Jackson, on 8/21/24 -    \"69-year-old female presented to the hospital on 8/21/2020 for black tarry stools and rib cage pain, mechanical fall, patient's current diagnosis and status in the hospital warrants observation status, not inpatient, code 44 review pursued, attending physician to correctly change status to observation as it only meets observation criteria per MCG guideline.\"      -Attending MD changed to observation status 8/21/24 @ 1147.    -CC44 notice delivered to patient 8/22/24 @ 0815. Copy of notice provided to patient & placed in patient's chart.  "

## 2024-08-21 NOTE — H&P (VIEW-ONLY)
[facility]  Initial Inpatient Consult Note    Referring Provider: Pablo Castro MD    Reason for Consultation: Melena,     History of present illness:    Patient is 69 y.o. female with known history of hypothyroidism s/p ablation of thyroid, hyperlipidemia, diverticulosis of the sigmoid colon and internal hemorrhoids admitted with right eighth and ninth rib fracture after a fall from broken chair about a week ago and since then she is taking ibuprofen 800 mg 3 times daily to control the pain.  On Monday, she was trying to  a Box from a local storage and had excruciating pain in the right lateral rib region and felt nauseated and vomited darkish brown vomitus.  Yesterday, around 5 PM she had black tarry BM.  Since then, no BM.  She denies nausea or vomiting at present.  Patient does not take any blood thinner or anticoagulant medications.    In the ED, hemoglobin reported 11.1 g/dL from 13.9 g/dL about a month ago.  BUN 43 and creatinine 0.91.  Electrolytes within normal range.  LFTs within normal range.  FOBT negative.    Past Medical History:  Past Medical History:   Diagnosis Date    Anemia 1962    Hospitalized /transfusions    Cancer     Cataract     Surgery to remove November 2022    Ear fullness, bilateral     Ear itching     Hearing loss     Hyperthyroidism     Hypothyroidism 2000??    After Thyroid Ablation    Osteopenia     Skin cancer     Thyroid disorder     Tinnitus of both ears      Past Surgical History:  Past Surgical History:   Procedure Laterality Date    APPENDECTOMY  1967    COLONOSCOPY  2018    COLONOSCOPY N/A 1/17/2024    Procedure: COLONOSCOPY to cecum into terminal ileum with cold biopsies;  Surgeon: Omkar Ponce MD;  Location: [facility] ENDOSCOPY;  Service: Gastroenterology;  Laterality: N/A;  pre: change in bowel habits  post: diverticulosis, proctitis, hemorrhoids    EYE SURGERY  Nov 2022    Cararacts removed      Social History:   Social History      Tobacco Use    Smoking status: Never     Passive exposure: Never    Smokeless tobacco: Never   Substance Use Topics    Alcohol use: Yes     Alcohol/week: 4.0 - 6.0 standard drinks of alcohol     Types: 4 - 6 Glasses of wine per week     Comment: 1-4 DRINKS PER WEEK      Family History:  Family History   Problem Relation Age of Onset    Cancer Mother         Cancer on a tumor at top of her stomach. Tumor removed. Cancer never came back.    Osteoporosis Mother     Arthritis Mother     Stroke Father     Heart disease Father         Triple by-pass; pace maker    Cancer Father         Squamous cell on lip. Cancer removed.    Skin cancer Father 80    Esophageal cancer Maternal Grandfather      Home Meds:  (Not in a hospital admission)    Current Meds:   Lidocaine, 1 patch, Transdermal, Q24H  pantoprazole, 40 mg, Intravenous, Q12H  sodium chloride, 10 mL, Intravenous, Q12H      Allergies:  No Known Allergies    Objective     Vital Signs  Temp:  [97.8 °F (36.6 °C)] 97.8 °F (36.6 °C)  Heart Rate:  [70] 70  Resp:  [16] 16  BP: (135)/(88) 135/88  Physical Exam:  General Appearance:    Alert and oriented, normal affect   Head:    Normocephalic, without obvious abnormality, atraumatic   Eyes:          conjunctivae and sclerae normal, no icterus, pupils round and reactive to light   Oral cavity: Moist and intact, normal dentation   Neck:   Supple, no adenopathy   Chest Wall/Lungs:    No abnormalities observed, equal on expansion bilaterally, no use of extrarespiratory muscles noted   Abdomen:     Soft, nondistended, nontender; normal bowel sounds, no hepatospleenomegaly, no ascites.  Rectal examination performed in presence of chaperone, darkish brown with grayish tinge in the stool smear   Extremities:   no edema, clubbing, or cyanosis   Skin:   No bruising or rash   Psychiatric:  normal mood and insight     Results Review:   I reviewed the patient's new clinical results.    Results from last 7 days   Lab Units  08/21/24  0448   WBC 10*3/mm3 7.53   HEMOGLOBIN g/dL 11.1*   MCV fL 92.8   PLATELETS 10*3/mm3 268         Lab 08/21/24  0448   NEUTROS ABS 4.79     Results from last 7 days   Lab Units 08/21/24  0448   BUN mg/dL 43*   CREATININE mg/dL 0.91   SODIUM mmol/L 139   POTASSIUM mmol/L 3.9   CHLORIDE mmol/L 105   CO2 mmol/L 24.3   GLUCOSE mg/dL 107*      Results from last 7 days   Lab Units 08/21/24  0448   PROTIME Seconds 13.9   INR  1.05     Results from last 7 days   Lab Units 08/21/24  0448   AST (SGOT) U/L 14   ALT (SGPT) U/L 7   ALK PHOS U/L 44   BILIRUBIN mg/dL 0.3   TOTAL PROTEIN g/dL 6.4   ALBUMIN g/dL 4.1                Radiology:  CT Abdomen Pelvis With Contrast    Result Date: 8/21/2024  1. Right-sided rib fractures are noted without evidence of definite pneumothorax or significant pleural fluid collections. 2. Low-attenuation lesions within the liver are similar to the prior study and compatible with cysts. There is no evidence of an acute traumatic abnormality of the liver or other solid organs within the right upper quadrant 3. Incidental note of cystic lesion within the uncinate process of the pancreas. Findings may represent dilation of the pancreatic duct in this region or other cystic lesions. Consider follow-up on a nonemergent basis with a MRI of the abdomen with contrast. 4. There is mild prominence of the endometrial canal. In the absence of hormone replacement therapy this is likely abnormal in this age group and follow-up with a pelvic ultrasound on a nonemergent basis is recommended 5. 5 mm noncalcified nodule at the left lung base. Optional follow-up at 12 months and at high risk patient recommended. 6. Other findings as above Electronically Signed: Akash Cotto MD  8/21/2024 7:40 AM EDT  Workstation ID: [id number]    XR Ribs Right With PA Chest    Result Date: 8/21/2024  Right lateral eighth and ninth rib fractures with no pneumothorax. Otherwise negative. Electronically Signed: Paul Montilla  MD  8/21/2024 5:23 AM EDT  Workstation ID: [id number]     Impression:     Melena     Impression:    1.  An episode of melena  2.  Prerenal azotemia  3.  Ibuprofen heavy dose intake  4.  Right lateral ninth and eighth rib fractures    Plan and Recommendations: Patient with eighth and ninth rib fracture was taking high dosage of ibuprofen for last 5 days and had an episode of melena last evening.  She had nausea and vomiting with darkish brown vomitus that been resolved.  Patient found to be FOBT negative. She does have prerenal azotemia and a drop in hemoglobin around 3 g/dL.  There is possibility of NSAIDs induced gastric ulceration versus gastritis.  Would recommend no NSAIDs, IV hydration, pain control, Protonix 40 mg IV twice daily, clear liquid diet now and n.p.o. at midnight.  Will do EGD in a.m.    I discussed the patients findings and my recommendations with patient, nursing staff, primary care team, and consulting provider.      Electronically signed by Ferdinand Paz MD, 08/21/24, 10:34 AM EDT.

## 2024-08-21 NOTE — ED PROVIDER NOTES
Time: 6:46 AM EDT  Date of encounter:  8/21/2024  Independent Historian/Clinical History and Information was obtained by:   Patient and Family    History is limited by: N/A    Chief Complaint: Right rib injury, black stool      History of Present Illness:  Patient is a 69 y.o. year old female who presents to the emergency department for evaluation of right-sided posterolateral rib injury after she was sitting in a metal chair last week and it suddenly gave way and she fell down injuring her right ribs.    She has obvious area of pain and bruising over the right posterolateral lower ribs.    She states a few days ago she was reaching overhead to grab something off a high shelf at the grocery store and felt something pop in the same area of her ribs and sudden pain.    In the last couple days when she would just walk up 1 flight of stairs she was getting very sweaty and nauseated and felt very lightheaded and felt the need to have diarrhea.    She reports black tarry stools, and vomiting up some dark coffee grounds emesis.  She is not on blood thinning medications.    Since her rib injury last week she has been taking ibuprofen 800 mg daily.      Patient Care Team  Primary Care Provider: Percy Duke APRN    Past Medical History:     No Known Allergies  Past Medical History:   Diagnosis Date    Anemia 1962    Hospitalized /transfusions    Cancer     Cataract     Surgery to remove November 2022    Ear fullness, bilateral     Ear itching     Hearing loss     Hyperthyroidism     Hypothyroidism 2000??    After Thyroid Ablation    Osteopenia     Skin cancer     Thyroid disorder     Tinnitus of both ears      Past Surgical History:   Procedure Laterality Date    APPENDECTOMY  1967    COLONOSCOPY  2018    COLONOSCOPY N/A 1/17/2024    Procedure: COLONOSCOPY to cecum into terminal ileum with cold biopsies;  Surgeon: Omkar Ponce MD;  Location: [facility] ENDOSCOPY;  Service: Gastroenterology;  Laterality: N/A;  pre:  change in bowel habits  post: diverticulosis, proctitis, hemorrhoids    EYE SURGERY  Nov 2022    Cararacts removed     Family History   Problem Relation Age of Onset    Cancer Mother         Cancer on a tumor at top of her stomach. Tumor removed. Cancer never came back.    Osteoporosis Mother     Arthritis Mother     Stroke Father     Heart disease Father         Triple by-pass; pace maker    Cancer Father         Squamous cell on lip. Cancer removed.    Skin cancer Father 80    Esophageal cancer Maternal Grandfather        Home Medications:  Prior to Admission medications    Medication Sig Start Date End Date Taking? Authorizing Provider   calcium carbonate (OS-YEYO) 1250 (500 Ca) MG tablet Take 2 tablets by mouth Daily.  Patient taking differently: Take 2 tablets by mouth 2 (Two) Times a Day As Needed (osteoporosis). 2/12/24   Percy Duke APRN   Diclofenac Sodium (VOLTAREN) 1 % gel gel Apply 4 g topically to the appropriate area as directed 4 (Four) Times a Day. 8/18/23   Bimal Valdviia, YNES   estrogen, conjugated,-medroxyprogesterone (PREMPRO) 0.3-1.5 MG per tablet Take 1 tablet by mouth Daily. 7/17/24   Percy Duke APRN   levothyroxine (SYNTHROID, LEVOTHROID) 125 MCG tablet Take 1 tablet by mouth Every Morning. 8/1/24   Percy Duke APRN   riFAXIMin (Xifaxan) 550 MG tablet Take 1 tablet by mouth Every 8 (Eight) Hours.  Patient not taking: Reported on 7/17/2024 2/1/24   Omkar Ponce MD        Social History:   Social History     Tobacco Use    Smoking status: Never     Passive exposure: Never    Smokeless tobacco: Never   Vaping Use    Vaping status: Never Used   Substance Use Topics    Alcohol use: Yes     Alcohol/week: 4.0 - 6.0 standard drinks of alcohol     Types: 4 - 6 Glasses of wine per week     Comment: 1-4 DRINKS PER WEEK    Drug use: Never         Review of Systems:  Review of Systems   I performed a 10 point review of systems which was all negative, except for the positives  "found in the HPI above.  Physical Exam:  /88 (BP Location: Left arm, Patient Position: Sitting)   Pulse 70   Temp 97.8 °F (36.6 °C) (Oral)   Resp 16   Ht 167.6 cm (66\")   Wt 66.1 kg (145 lb 11.6 oz)   SpO2 100%   BMI 23.52 kg/m²     Physical Exam   General: Awake alert and in no obvious distress    HEENT: Head normocephalic atraumatic, eyes PERRLA EOMI, nose normal, oropharynx normal.    Neck: Supple full range of motion, no meningismus, no lymphadenopathy    Heart: Regular rate and rhythm, no murmurs or rubs, 2+ radial pulses bilaterally    Lungs: Clear to auscultation bilaterally without wheezes or crackles, no respiratory distress    Abdomen: Soft, mild epigastric tenderness, nondistended, no rebound or guarding    Thorax exam: Reproducible right sided localized posterolateral rib tenderness and swelling and obvious area of ecchymosis but no crepitus    Skin: Warm, dry, no rash    Musculoskeletal: Normal range of motion, no lower extremity edema    Neurologic: Oriented x3, no motor deficits no sensory deficits    Psychiatric: Mood appears stable, no psychosis          Procedures:  Procedures      Medical Decision Making:      Comorbidities that affect care:    Rib injury, NSAID use    External Notes reviewed:    Previous Labs: I compared her labs today with her baseline labs and it looks like she has had an acute drop in her hemoglobin from 13.9-11.1 today.      The following orders were placed and all results were independently analyzed by me:  Orders Placed This Encounter   Procedures    XR Ribs Right With PA Chest    CT Abdomen Pelvis With Contrast    Moffit Draw    Comprehensive Metabolic Panel    Lipase    Urinalysis With Microscopic If Indicated (No Culture) - Urine, Clean Catch    Lactic Acid, Plasma    CBC Auto Differential    Occult Blood, Fecal By Immunoassay - Stool, Per Rectum    Protime-INR    NPO Diet NPO Type: Strict NPO    Undress & Gown    Code Status and Medical Interventions: CPR " (Attempt to Resuscitate); Full Support    Gastroenterology (on-call MD unless specified)    Hospitalist (on-call MD unless specified)    Type & Screen    ABO RH Specimen Verification    Insert Peripheral IV    Inpatient Admission    CBC & Differential    Green Top (Gel)    Lavender Top    Gold Top - SST    Light Blue Top    Extra Tubes    Lavender Top    Green Top (Gel)       Medications Given in the Emergency Department:  Medications   sodium chloride 0.9 % flush 10 mL (has no administration in time range)   Lidocaine 4 % 1 patch (has no administration in time range)   sodium chloride 0.9 % bolus 1,000 mL (0 mL Intravenous Stopped 8/21/24 0757)   pantoprazole (PROTONIX) injection 80 mg (80 mg Intravenous Given 8/21/24 0729)   iopamidol (ISOVUE-370) 76 % injection 100 mL (100 mL Intravenous Given 8/21/24 0721)        ED Course:         Labs:    Lab Results (last 24 hours)       Procedure Component Value Units Date/Time    CBC & Differential [[id number]]  (Abnormal) Collected: 08/21/24 0448    Specimen: Blood Updated: 08/21/24 0510    Narrative:      The following orders were created for panel order CBC & Differential.  Procedure                               Abnormality         Status                     ---------                               -----------         ------                     CBC Auto Differential[[id number]]        Abnormal            Final result                 Please view results for these tests on the individual orders.    Comprehensive Metabolic Panel [[id number]]  (Abnormal) Collected: 08/21/24 0448    Specimen: Blood Updated: 08/21/24 0537     Glucose 107 mg/dL      BUN 43 mg/dL      Creatinine 0.91 mg/dL      Sodium 139 mmol/L      Potassium 3.9 mmol/L      Chloride 105 mmol/L      CO2 24.3 mmol/L      Calcium 8.7 mg/dL      Total Protein 6.4 g/dL      Albumin 4.1 g/dL      ALT (SGPT) 7 U/L      AST (SGOT) 14 U/L      Alkaline Phosphatase 44 U/L      Total Bilirubin 0.3 mg/dL      Globulin 2.3  gm/dL      A/G Ratio 1.8 g/dL      BUN/Creatinine Ratio 47.3     Anion Gap 9.7 mmol/L      eGFR 69.3 mL/min/1.73     Narrative:      GFR Normal >60  Chronic Kidney Disease <60  Kidney Failure <15      Lipase [[id number]]  (Normal) Collected: 08/21/24 0448    Specimen: Blood Updated: 08/21/24 0525     Lipase 55 U/L     Lactic Acid, Plasma [[id number]]  (Normal) Collected: 08/21/24 0448    Specimen: Blood Updated: 08/21/24 0521     Lactate 0.9 mmol/L     CBC Auto Differential [[id number]]  (Abnormal) Collected: 08/21/24 0448    Specimen: Blood Updated: 08/21/24 0510     WBC 7.53 10*3/mm3      RBC 3.60 10*6/mm3      Hemoglobin 11.1 g/dL      Hematocrit 33.4 %      MCV 92.8 fL      MCH 30.8 pg      MCHC 33.2 g/dL      RDW 12.6 %      RDW-SD 42.9 fl      MPV 10.3 fL      Platelets 268 10*3/mm3      Neutrophil % 63.6 %      Lymphocyte % 28.3 %      Monocyte % 6.4 %      Eosinophil % 0.8 %      Basophil % 0.5 %      Immature Grans % 0.4 %      Neutrophils, Absolute 4.79 10*3/mm3      Lymphocytes, Absolute 2.13 10*3/mm3      Monocytes, Absolute 0.48 10*3/mm3      Eosinophils, Absolute 0.06 10*3/mm3      Basophils, Absolute 0.04 10*3/mm3      Immature Grans, Absolute 0.03 10*3/mm3      nRBC 0.0 /100 WBC     Protime-INR [[id number]]  (Normal) Collected: 08/21/24 0448    Specimen: Blood Updated: 08/21/24 0811     Protime 13.9 Seconds      INR 1.05    Narrative:      Suggested Therapeutic Ranges For Oral Anticoagulant Therapy:  Level of Therapy                      INR Target Range  Standard Dose                            2.0-3.0  High Dose                                2.5-3.5  Patients not receiving anticoagulant  Therapy Normal Range                     0.86-1.15    Urinalysis With Microscopic If Indicated (No Culture) - Urine, Clean Catch [[id number]]  (Normal) Collected: 08/21/24 0625    Specimen: Urine, Clean Catch Updated: 08/21/24 0636     Color, UA Yellow     Appearance, UA Clear     pH, UA <=5.0     Specific Gravity, UA  1.025     Glucose, UA Negative     Ketones, UA Negative     Bilirubin, UA Negative     Blood, UA Negative     Protein, UA Negative     Leuk Esterase, UA Negative     Nitrite, UA Negative     Urobilinogen, UA 0.2 E.U./dL    Narrative:      Urine microscopic not indicated.    Occult Blood, Fecal By Immunoassay - Stool, Per Rectum [[id number]]  (Normal) Collected: 08/21/24 0748    Specimen: Stool from Per Rectum Updated: 08/21/24 0806     Occult Blood, Fecal by Immunoassay Negative             Imaging:    CT Abdomen Pelvis With Contrast    Result Date: 8/21/2024  CT ABDOMEN PELVIS W CONTRAST Date of Exam: 8/21/2024 7:12 AM EDT Indication: eval epigasric abd pain, GI bleeding, R rib fx (eval liver lac). Comparison: None available. Technique: Axial CT images were obtained of the abdomen and pelvis after the uneventful intravenous administration of iodinated contrast. Reconstructed coronal and sagittal images were also obtained. Automated exposure control and iterative construction methods were used. FINDINGS: Abdomen/Pelvis: Lower Chest: Transitional vertebra at L1 with small tiny riblets noted. Acute fracture of the ninth and 10th ribs laterally noted. There is a stable subpleural nodule measuring 5 mm at the left costophrenic angle. Minimal dependent atelectasis is noted. There is no significant pleural effusion or pneumothorax identified. No free air is noted below the diaphragm. Organs: Bilobed fluid density lesion at the periphery of the right hepatic lobe is stable from the comparison compatible with a cyst which does not change on delayed imaging. Additional small low-attenuation lesions within the liver are noted which also likely represent cysts. There is no acute abnormality of the liver and no subhepatic fluid collections to suggest acute traumatic abnormality. The portal vein opacifies unremarkably. The gallbladder appears unremarkable The right kidney demonstrates small low-attenuation lesion too small to  characterize but likely a cyst. No evidence of acute traumatic abnormality noted. No significant change from the comparison. Low-attenuation focus which may represent cyst or dilation of the pancreatic duct in the region of the uncinate process is again noted. Pancreas is otherwise grossly unremarkable in appearance. The spleen, left kidney and adrenal glands are grossly unremarkable in their appearance GI/Bowel: Stomach, small bowel demonstrate no acute abnormality. No suspicious mesenteric adenopathy or fluid collection is noted. The colon demonstrates no acute abnormality. Cecum is displaced anteriorly. The ileocecal valve appears unremarkable. The appendix is not well visualized but no inflammatory changes are noted. Pelvis: Mild prominence of the pelvic vasculature greater on the left than the right is noted. Findings are nonspecific but can be seen with pelvic congestion syndrome. The ovaries are grossly unremarkable in appearance. Uterus is unremarkable. Mild prominence of the endometrial canal is questioned. This would be abnormal in the absence of hormonal replacement therapy in this age group. Urinary bladder is unremarkable Peritoneum/Retroperitoneum: Aorta is normal in caliber. There is no suspicious retroperitoneal adenopathy. Atherosclerotic changes of the aorta and its branches noted Bones/Soft Tissues: Fractures of right-sided ribs as described above. Multilevel degenerative changes are noted of the spine greatest at L5-S1. Transitional vertebra at L1 as above. Soft tissue symmetry no definite acute abnormality.     1. Right-sided rib fractures are noted without evidence of definite pneumothorax or significant pleural fluid collections. 2. Low-attenuation lesions within the liver are similar to the prior study and compatible with cysts. There is no evidence of an acute traumatic abnormality of the liver or other solid organs within the right upper quadrant 3. Incidental note of cystic lesion within  the uncinate process of the pancreas. Findings may represent dilation of the pancreatic duct in this region or other cystic lesions. Consider follow-up on a nonemergent basis with a MRI of the abdomen with contrast. 4. There is mild prominence of the endometrial canal. In the absence of hormone replacement therapy this is likely abnormal in this age group and follow-up with a pelvic ultrasound on a nonemergent basis is recommended 5. 5 mm noncalcified nodule at the left lung base. Optional follow-up at 12 months and at high risk patient recommended. 6. Other findings as above Electronically Signed: Akash Cotto MD  8/21/2024 7:40 AM EDT  Workstation ID: [id number]    XR Ribs Right With PA Chest    Result Date: 8/21/2024  XR RIBS RIGHT W PA CHEST Date of Exam: 8/21/2024 5:05 AM EDT Indication: Rib pain with shortness of air since fall 1 week ago. Comparison: 10/10/2021 Findings: Cardiac and mediastinal contours are normal. Pulmonary vascularity is normal. No pneumothorax is seen. The lungs are clear. There is degenerative disease at the right glenohumeral joint. There are right lateral eighth and ninth rib fractures.     Right lateral eighth and ninth rib fractures with no pneumothorax. Otherwise negative. Electronically Signed: Paul Montilla MD  8/21/2024 5:23 AM EDT  Workstation ID: [id number]       Differential Diagnosis and Discussion:    GI Bleeding: Differential diagnosis includes but is not limited to gastritis, gastric ulcer, stress ulcer, duodenitis, Brigid-Doe tears, esophageal varices, angiodysplasia, aortic enteric fistula, hematologic issues including thrombocytopenia, GI neoplasm, ulcerative colitis, Crohn's disease, diverticulosis, diverticulitis, hemorrhoids, aortic aneurysm, and polyps    All labs were reviewed and interpreted by me.  All X-rays impressions were independently interpreted by me.  CT scan radiology impression was interpreted by me.    MDM     Amount and/or Complexity of Data  Reviewed  Clinical lab tests: reviewed  Tests in the radiology section of CPT®: reviewed  Decide to obtain previous medical records or to obtain history from someone other than the patient: yes         This patient is a 69-year-old female who had a recent fall and rib injury last week and now having black tarry stool and vomiting coffee-ground emesis and feeling lightheaded.    Lab work reflects a drop in her baseline hemoglobin of 13.9, down to 11.1 today.    She feels lightheaded and gets sweaty when she gets up and exerts herself and I believe she is having an upper GI bleed.    I am giving her a liter of IV fluids and IV Protonix in the ED and will check Hemoccult of stool and check coags.    I am consulting with gastroenterology for her GI bleeding and we will plan to admit her.    In addition because she has visualized right-sided rib fractures on x-ray and now having blood loss I will get CT abdomen pelvis to evaluate for possible liver lack or any bowel injury.    I suspect if CT negative for traumatic intra-abdominal injury, that she has NSAID induced GI bleeding from using ibuprofen 800 this week for her rib injury.          Critical Care Note: Total Critical Care time of 35 minutes. Total critical care time documented does not include time spent on separately billed procedures for services of nurses or physician assistants. I personally saw and examined the patient. I have reviewed all diagnostic interpretations and treatment plans as written. I was present for the key portions of any procedures performed and the inclusive time noted in any critical care statement. Critical care time includes patient management by me, time spent at the patients bedside,  time to review lab and imaging results, discussing patient care, documentation in the medical record, and time spent with family or caregiver.        Patient Care Considerations:          Consultants/Shared Management Plan:    Hospitalist: I have  discussed the case with the admitting hospitalist who agrees to accept the patient for admission.  Consultant: I have discussed the case with the on-call gastroenterologist who agrees to consult on the patient.    Social Determinants of Health:    Patient is independent, reliable, and has access to care.       Disposition and Care Coordination:    Admit:   Through independent evaluation of the patient's history, physical, and imperical data, the patient meets criteria for inpatient admission to the hospital.        Final diagnoses:   Acute upper GI bleed   Closed fracture of multiple ribs of right side, initial encounter   Acute blood loss anemia        ED Disposition       ED Disposition   Decision to Admit    Condition   --    Comment   Level of Care: Telemetry [5]   Diagnosis: Melena [[id number]]   Certification: I Certify That Inpatient Hospital Services Are Medically Necessary For Greater Than 2 Midnights                 This medical record created using voice recognition software.             Pablo Castro MD  08/21/24 5059

## 2024-08-21 NOTE — PLAN OF CARE
Goal Outcome Evaluation:              Outcome Evaluation: Resting in bed. Patient awaiting GI consult per possible EGD tomorrow.

## 2024-08-22 ENCOUNTER — ANESTHESIA (OUTPATIENT)
Dept: GASTROENTEROLOGY | Facility: HOSPITAL | Age: 69
End: 2024-08-22
Payer: MEDICARE

## 2024-08-22 ENCOUNTER — ANESTHESIA EVENT (OUTPATIENT)
Dept: GASTROENTEROLOGY | Facility: HOSPITAL | Age: 69
End: 2024-08-22
Payer: MEDICARE

## 2024-08-22 LAB
ALBUMIN SERPL-MCNC: 3.3 G/DL (ref 3.5–5.2)
ALBUMIN/GLOB SERPL: 1.8 G/DL
ALP SERPL-CCNC: 36 U/L (ref 39–117)
ALT SERPL W P-5'-P-CCNC: 7 U/L (ref 1–33)
ANION GAP SERPL CALCULATED.3IONS-SCNC: 6.8 MMOL/L (ref 5–15)
AST SERPL-CCNC: 13 U/L (ref 1–32)
BASOPHILS # BLD AUTO: 0.03 10*3/MM3 (ref 0–0.2)
BASOPHILS NFR BLD AUTO: 0.7 % (ref 0–1.5)
BILIRUB SERPL-MCNC: 0.3 MG/DL (ref 0–1.2)
BUN SERPL-MCNC: 17 MG/DL (ref 8–23)
BUN/CREAT SERPL: 19.8 (ref 7–25)
CALCIUM SPEC-SCNC: 8.8 MG/DL (ref 8.6–10.5)
CHLORIDE SERPL-SCNC: 109 MMOL/L (ref 98–107)
CO2 SERPL-SCNC: 26.2 MMOL/L (ref 22–29)
CREAT SERPL-MCNC: 0.86 MG/DL (ref 0.57–1)
DEPRECATED RDW RBC AUTO: 43.9 FL (ref 37–54)
EGFRCR SERPLBLD CKD-EPI 2021: 74.2 ML/MIN/1.73
EOSINOPHIL # BLD AUTO: 0.08 10*3/MM3 (ref 0–0.4)
EOSINOPHIL NFR BLD AUTO: 1.9 % (ref 0.3–6.2)
ERYTHROCYTE [DISTWIDTH] IN BLOOD BY AUTOMATED COUNT: 12.9 % (ref 12.3–15.4)
GLOBULIN UR ELPH-MCNC: 1.8 GM/DL
GLUCOSE BLDC GLUCOMTR-MCNC: 89 MG/DL (ref 70–99)
GLUCOSE SERPL-MCNC: 93 MG/DL (ref 65–99)
HCT VFR BLD AUTO: 26.7 % (ref 34–46.6)
HCT VFR BLD AUTO: 26.7 % (ref 34–46.6)
HCT VFR BLD AUTO: 27 % (ref 34–46.6)
HGB BLD-MCNC: 8.8 G/DL (ref 12–15.9)
HGB BLD-MCNC: 8.8 G/DL (ref 12–15.9)
HGB BLD-MCNC: 9 G/DL (ref 12–15.9)
IMM GRANULOCYTES # BLD AUTO: 0.02 10*3/MM3 (ref 0–0.05)
IMM GRANULOCYTES NFR BLD AUTO: 0.5 % (ref 0–0.5)
LYMPHOCYTES # BLD AUTO: 1.32 10*3/MM3 (ref 0.7–3.1)
LYMPHOCYTES NFR BLD AUTO: 30.9 % (ref 19.6–45.3)
MAGNESIUM SERPL-MCNC: 2.2 MG/DL (ref 1.6–2.4)
MCH RBC QN AUTO: 30.9 PG (ref 26.6–33)
MCHC RBC AUTO-ENTMCNC: 33 G/DL (ref 31.5–35.7)
MCV RBC AUTO: 93.7 FL (ref 79–97)
MONOCYTES # BLD AUTO: 0.32 10*3/MM3 (ref 0.1–0.9)
MONOCYTES NFR BLD AUTO: 7.5 % (ref 5–12)
NEUTROPHILS NFR BLD AUTO: 2.5 10*3/MM3 (ref 1.7–7)
NEUTROPHILS NFR BLD AUTO: 58.5 % (ref 42.7–76)
NRBC BLD AUTO-RTO: 0 /100 WBC (ref 0–0.2)
PHOSPHATE SERPL-MCNC: 3.2 MG/DL (ref 2.5–4.5)
PLATELET # BLD AUTO: 181 10*3/MM3 (ref 140–450)
PMV BLD AUTO: 10.4 FL (ref 6–12)
POTASSIUM SERPL-SCNC: 4.5 MMOL/L (ref 3.5–5.2)
PROT SERPL-MCNC: 5.1 G/DL (ref 6–8.5)
RBC # BLD AUTO: 2.85 10*6/MM3 (ref 3.77–5.28)
SODIUM SERPL-SCNC: 142 MMOL/L (ref 136–145)
WBC NRBC COR # BLD AUTO: 4.27 10*3/MM3 (ref 3.4–10.8)

## 2024-08-22 PROCEDURE — 85018 HEMOGLOBIN: CPT | Performed by: STUDENT IN AN ORGANIZED HEALTH CARE EDUCATION/TRAINING PROGRAM

## 2024-08-22 PROCEDURE — 85018 HEMOGLOBIN: CPT | Performed by: INTERNAL MEDICINE

## 2024-08-22 PROCEDURE — 85014 HEMATOCRIT: CPT | Performed by: STUDENT IN AN ORGANIZED HEALTH CARE EDUCATION/TRAINING PROGRAM

## 2024-08-22 PROCEDURE — 99233 SBSQ HOSP IP/OBS HIGH 50: CPT | Performed by: INTERNAL MEDICINE

## 2024-08-22 PROCEDURE — 85014 HEMATOCRIT: CPT | Performed by: INTERNAL MEDICINE

## 2024-08-22 PROCEDURE — 96376 TX/PRO/DX INJ SAME DRUG ADON: CPT

## 2024-08-22 PROCEDURE — 84100 ASSAY OF PHOSPHORUS: CPT | Performed by: STUDENT IN AN ORGANIZED HEALTH CARE EDUCATION/TRAINING PROGRAM

## 2024-08-22 PROCEDURE — 83735 ASSAY OF MAGNESIUM: CPT | Performed by: STUDENT IN AN ORGANIZED HEALTH CARE EDUCATION/TRAINING PROGRAM

## 2024-08-22 PROCEDURE — 85025 COMPLETE CBC W/AUTO DIFF WBC: CPT | Performed by: STUDENT IN AN ORGANIZED HEALTH CARE EDUCATION/TRAINING PROGRAM

## 2024-08-22 PROCEDURE — 43270 EGD LESION ABLATION: CPT | Performed by: INTERNAL MEDICINE

## 2024-08-22 PROCEDURE — 82948 REAGENT STRIP/BLOOD GLUCOSE: CPT | Performed by: STUDENT IN AN ORGANIZED HEALTH CARE EDUCATION/TRAINING PROGRAM

## 2024-08-22 PROCEDURE — 80053 COMPREHEN METABOLIC PANEL: CPT | Performed by: STUDENT IN AN ORGANIZED HEALTH CARE EDUCATION/TRAINING PROGRAM

## 2024-08-22 PROCEDURE — G0378 HOSPITAL OBSERVATION PER HR: HCPCS

## 2024-08-22 PROCEDURE — 25010000002 PROPOFOL 10 MG/ML EMULSION: Performed by: NURSE ANESTHETIST, CERTIFIED REGISTERED

## 2024-08-22 PROCEDURE — 25810000003 LACTATED RINGERS PER 1000 ML

## 2024-08-22 PROCEDURE — 36415 COLL VENOUS BLD VENIPUNCTURE: CPT | Performed by: STUDENT IN AN ORGANIZED HEALTH CARE EDUCATION/TRAINING PROGRAM

## 2024-08-22 PROCEDURE — 25810000003 LACTATED RINGERS PER 1000 ML: Performed by: INTERNAL MEDICINE

## 2024-08-22 RX ORDER — SUCRALFATE 1 G/1
1 TABLET ORAL
Status: DISCONTINUED | OUTPATIENT
Start: 2024-08-22 | End: 2024-08-23 | Stop reason: HOSPADM

## 2024-08-22 RX ORDER — SODIUM CHLORIDE, SODIUM LACTATE, POTASSIUM CHLORIDE, CALCIUM CHLORIDE 600; 310; 30; 20 MG/100ML; MG/100ML; MG/100ML; MG/100ML
1000 INJECTION, SOLUTION INTRAVENOUS CONTINUOUS
Status: DISCONTINUED | OUTPATIENT
Start: 2024-08-22 | End: 2024-08-23 | Stop reason: HOSPADM

## 2024-08-22 RX ORDER — LIDOCAINE HYDROCHLORIDE 20 MG/ML
INJECTION, SOLUTION EPIDURAL; INFILTRATION; INTRACAUDAL; PERINEURAL AS NEEDED
Status: DISCONTINUED | OUTPATIENT
Start: 2024-08-22 | End: 2024-08-22 | Stop reason: SURG

## 2024-08-22 RX ORDER — PROPOFOL 10 MG/ML
VIAL (ML) INTRAVENOUS AS NEEDED
Status: DISCONTINUED | OUTPATIENT
Start: 2024-08-22 | End: 2024-08-22 | Stop reason: SURG

## 2024-08-22 RX ADMIN — SODIUM CHLORIDE, POTASSIUM CHLORIDE, SODIUM LACTATE AND CALCIUM CHLORIDE 1000 ML: 600; 310; 30; 20 INJECTION, SOLUTION INTRAVENOUS at 09:38

## 2024-08-22 RX ADMIN — PROPOFOL 60 MG: 10 INJECTION, EMULSION INTRAVENOUS at 11:26

## 2024-08-22 RX ADMIN — PANTOPRAZOLE SODIUM 40 MG: 40 INJECTION, POWDER, FOR SOLUTION INTRAVENOUS at 20:50

## 2024-08-22 RX ADMIN — PROPOFOL 200 MCG/KG/MIN: 10 INJECTION, EMULSION INTRAVENOUS at 11:27

## 2024-08-22 RX ADMIN — LIDOCAINE HYDROCHLORIDE 60 MG: 20 INJECTION, SOLUTION EPIDURAL; INFILTRATION; INTRACAUDAL; PERINEURAL at 11:26

## 2024-08-22 RX ADMIN — SUCRALFATE 1 G: 1 TABLET ORAL at 20:50

## 2024-08-22 RX ADMIN — SUCRALFATE 1 G: 1 TABLET ORAL at 13:32

## 2024-08-22 RX ADMIN — Medication 10 ML: at 08:50

## 2024-08-22 RX ADMIN — PANTOPRAZOLE SODIUM 40 MG: 40 INJECTION, POWDER, FOR SOLUTION INTRAVENOUS at 08:50

## 2024-08-22 RX ADMIN — SUCRALFATE 1 G: 1 TABLET ORAL at 16:30

## 2024-08-22 NOTE — PROGRESS NOTES
[facility] Progress Note      Patient Name:  Jessie Peck   MRN:  [id number]   :  1955   Date of Admission:  2024   Date of Service:  2024   PMD:  Percy Duke APRN     [facility] Course:    69 y.o. female with a past medical history of hypothyroidism, hyperlipidemia, internal hemorrhoids, diverticulosis of the sigmoid colon presented to the ED for evaluation of right-sided chest wall injury, black-colored stools.  Approximately a week ago patient was sitting in a chair that broke and she fell onto her right side hitting onto the right posterior lateral rib region.  Since then patient has been having pain in that region and has been taking ibuprofen 800 mg thrice a day for the past 5 days for pain control.  Yesterday patient was trying to pick something from the storage and she felt excruciating pain in her right lateral hip region and after that patient noticed having nausea and dizziness, had an episode of vomiting and noticed slight black-colored vomitus.  Later in the evening had an episode of tarry black-colored stools.  Denies any similar episodes in the past.  Not on iron pills.  Occasional drinks alcohol.  Denies any smoking.  Prior to this episode intermittently she used to use ibuprofen as needed.  Denies any fevers, chills, shortness of breath.    Emergency room course.     In the ED, vital signs temperature 97.8, pulse 70, respiratory 16, blood pressure 135/88 on room air saturating at 100%.  Labs showed CMP with no significant findings, normal lactic acid, normal lipase, normal WBC, hemoglobin 11.1, hemoglobin A1c was 13.9, platelets 268 urinalysis is noninfectious.  Stool occult negative.  Chest x-ray showed right lateral eighth and ninth rib fractures with no pneumothorax.  CT abdomen pelvis with contrast showed low-attenuation lesions within the liver are similar to the prior study and compatible with cyst.  There is no evidence of acute traumatic abnormality of  the liver or other solid organs within the right upper quadrant.  Incidental note of cystic lesions within the uncinate process of the pancreas.  Finding may represent dilatation of the pancreatic duct in the region or other cystic lesions.  Consider follow-up on a nonemergent basis with MRI of the abdomen with contrast.  Noncalcified nodule on the left lung base.  Received IV fluids and IV Protonix in the ED.  Case has been discussed by the ED physician with the gastroenterologist on-call Dr. Paz, recommended to keep the patient n.p.o. and will be consulted.  Patient has been admitted for further evaluation and management of melena.  Consultants:    Gastroenterology      Procedures:  EGD    Anti-infectives:    CT abdomen with contrast  Chest x-ray    08/22/2024    Chief Complaint: Follow-up patient with melena off and on since few days    Subjective:   Underwent EGD today  Denies any abdominal pain feels better    Review Of Systems:  GENERAL:  No weakness , no fatigue, no time is denies any weight loss appetite is normal.  HEENT:  Denies any rhinitis, no sore throat, no diplopia , hearing is normal  Respiratory:  Denies any shortness of breath , sweating d, yspnea on exertion , cough or sputum.  CVS:  Denies any chest pain , palpitation or syncope.  Gi:  No nausea, no vomiting, no diarrhea, complains of hematemesis no melena no rectal bleeding  :  No dysuria frequency , hematuria, no retention:  Musculoskeletal:  Denies any arthritis, or calf pain    Hematological:  No bleeding , no petechiae.  Skin:  Denies rash , pruritus , jaundice  Endocrine:  No polyuria , polydipsia , polyphagia.  CNS:  Denies any confusion , headache , or seizure disorder  Psychiatry:  No anxiety , or depression, no suicide ideation      Objective:  Vitals:    08/22/24 1200 08/22/24 1205 08/22/24 1210 08/22/24 1352   BP: 100/70 108/72 115/76 143/82   BP Location:    Right arm   Patient Position:    Lying   Pulse: 63 60 57 62   Resp: 15 15  "14 14   Temp:   97.2 °F (36.2 °C) 97.7 °F (36.5 °C)   TempSrc:    Oral   SpO2: 100% 100% 100% 99%   Weight:       Height:              Physical Exam:  GENERAL APPEARANCE: Alert and oriented.  Appears comfortable.  No acute distress  HEENT: Atraumatic, normocephalic,  PERRLA, mucous membranes moist  NECK: supple; no JVD or thyromegaly noted  CARDIOVASCULAR: Regular rate and rhythm, no murmurs appreciated; no edema present in BLE   RESPIRATORY: good air entry bilaterally.  No wheezes, rhonchi, or rales appreciated  GASTROINTESTINAL:  Abdomen  soft; bowel sounds present, non-tender, non-distended, no organomegaly, no CVA tenderness   EXTREMITIES: Pulses equal bilaterally   MUSCULOSKELETAL:  Good muscle strength noted no obvious deformity appreciat  PSYCH: Appropriate mood and affect  Neurologic:  Alert & oriented x 3.  Normal Mental status.  Normal Cranial Nervies.  EOMI.  SILVIANO.  Normal 5/5 muscular strength in both upper and lower extremities.  Normal sensation.  Normal and symmetric reflexes. Normal cerbellar function.  Normal Gait. Negative Babinski.    Labs Reviewed  CBC:    Lab Results   Component Value Date    WBC 4.27 08/22/2024    HGB 8.8 (L) 08/22/2024    HGB 8.8 (L) 08/22/2024    HCT 26.7 (L) 08/22/2024    HCT 26.7 (L) 08/22/2024    MCV 93.7 08/22/2024     CMP:    Lab Results   Component Value Date     08/22/2024    CO2 26.2 08/22/2024    GLUCOSE 93 08/22/2024    BUN 17 08/22/2024    CREATININE 0.86 08/22/2024    ALBUMIN 3.3 (L) 08/22/2024    CALCIUM 8.8 08/22/2024    AST 13 08/22/2024    ALT 7 08/22/2024     Lipis Panel:   Lab Results   Component Value Date    CHOL 235 (H) 07/10/2024    HDL 69 (H) 07/10/2024      INR:    Lab Results   Component Value Date    INR 1.05 08/21/2024     Cardiac:    Lab Results   Component Value Date    CKMB 1.74 10/10/2021     No results found for: \"BNP\"  Lactate:    Lab Results   Component Value Date    LACTATE 0.9 08/21/2024     Blood Culture:  " @lastlabx(cult:2)@    Imaging:  CT Abdomen Pelvis With Contrast    Result Date: 8/21/2024  Narrative: CT ABDOMEN PELVIS W CONTRAST Date of Exam: 8/21/2024 7:12 AM EDT Indication: eval epigasric abd pain, GI bleeding, R rib fx (eval liver lac). Comparison: None available. Technique: Axial CT images were obtained of the abdomen and pelvis after the uneventful intravenous administration of iodinated contrast. Reconstructed coronal and sagittal images were also obtained. Automated exposure control and iterative construction methods were used. FINDINGS: Abdomen/Pelvis: Lower Chest: Transitional vertebra at L1 with small tiny riblets noted. Acute fracture of the ninth and 10th ribs laterally noted. There is a stable subpleural nodule measuring 5 mm at the left costophrenic angle. Minimal dependent atelectasis is noted. There is no significant pleural effusion or pneumothorax identified. No free air is noted below the diaphragm. Organs: Bilobed fluid density lesion at the periphery of the right hepatic lobe is stable from the comparison compatible with a cyst which does not change on delayed imaging. Additional small low-attenuation lesions within the liver are noted which also likely represent cysts. There is no acute abnormality of the liver and no subhepatic fluid collections to suggest acute traumatic abnormality. The portal vein opacifies unremarkably. The gallbladder appears unremarkable The right kidney demonstrates small low-attenuation lesion too small to characterize but likely a cyst. No evidence of acute traumatic abnormality noted. No significant change from the comparison. Low-attenuation focus which may represent cyst or dilation of the pancreatic duct in the region of the uncinate process is again noted. Pancreas is otherwise grossly unremarkable in appearance. The spleen, left kidney and adrenal glands are grossly unremarkable in their appearance GI/Bowel: Stomach, small bowel demonstrate no acute  abnormality. No suspicious mesenteric adenopathy or fluid collection is noted. The colon demonstrates no acute abnormality. Cecum is displaced anteriorly. The ileocecal valve appears unremarkable. The appendix is not well visualized but no inflammatory changes are noted. Pelvis: Mild prominence of the pelvic vasculature greater on the left than the right is noted. Findings are nonspecific but can be seen with pelvic congestion syndrome. The ovaries are grossly unremarkable in appearance. Uterus is unremarkable. Mild prominence of the endometrial canal is questioned. This would be abnormal in the absence of hormonal replacement therapy in this age group. Urinary bladder is unremarkable Peritoneum/Retroperitoneum: Aorta is normal in caliber. There is no suspicious retroperitoneal adenopathy. Atherosclerotic changes of the aorta and its branches noted Bones/Soft Tissues: Fractures of right-sided ribs as described above. Multilevel degenerative changes are noted of the spine greatest at L5-S1. Transitional vertebra at L1 as above. Soft tissue symmetry no definite acute abnormality.     Impression: 1. Right-sided rib fractures are noted without evidence of definite pneumothorax or significant pleural fluid collections. 2. Low-attenuation lesions within the liver are similar to the prior study and compatible with cysts. There is no evidence of an acute traumatic abnormality of the liver or other solid organs within the right upper quadrant 3. Incidental note of cystic lesion within the uncinate process of the pancreas. Findings may represent dilation of the pancreatic duct in this region or other cystic lesions. Consider follow-up on a nonemergent basis with a MRI of the abdomen with contrast. 4. There is mild prominence of the endometrial canal. In the absence of hormone replacement therapy this is likely abnormal in this age group and follow-up with a pelvic ultrasound on a nonemergent basis is recommended 5. 5 mm  noncalcified nodule at the left lung base. Optional follow-up at 12 months and at high risk patient recommended. 6. Other findings as above Electronically Signed: Akash Cotto MD  8/21/2024 7:40 AM EDT  Workstation ID: [id number]    XR Ribs Right With PA Chest    Result Date: 8/21/2024  Narrative: XR RIBS RIGHT W PA CHEST Date of Exam: 8/21/2024 5:05 AM EDT Indication: Rib pain with shortness of air since fall 1 week ago. Comparison: 10/10/2021 Findings: Cardiac and mediastinal contours are normal. Pulmonary vascularity is normal. No pneumothorax is seen. The lungs are clear. There is degenerative disease at the right glenohumeral joint. There are right lateral eighth and ninth rib fractures.     Impression: Right lateral eighth and ninth rib fractures with no pneumothorax. Otherwise negative. Electronically Signed: Paul Montilla MD  8/21/2024 5:23 AM EDT  Workstation ID: [id number]      Medications Reviewed:  Lidocaine, 1 patch, Transdermal, Q24H  pantoprazole, 40 mg, Intravenous, Q12H  sodium chloride, 10 mL, Intravenous, Q12H  sucralfate, 1 g, Oral, 4x Daily AC & at Bedtime         Assessment/Plan:      Melena    Acute upper GI bleed    Prerenal azotemia      Medical decision making:  Mechanical fall:  Fall precautions  PT OT    GI bleed with melena:  S/p EGD  Continue Protonix  Monitor H&H.    Fracture lateral eighth and ninth rib:  Continue current pain control.    Hypothyroidism:  Continue replacement    Hyperlipidemia  Continue statins    DVT Prophylaxis:    SCUDs  CODE STATUS:  Full code  \  Reason for continued hospitalization  and medical necessity:  Patient's status post fall and GI bleeding requiring further management    Orders:  CBC  CMP IV Protonix  IV fluids          Time spent:  38+ minute not only  including face-to-face rounding putting in the orders writing the note and all the conversation reviewing the records    This transcription was electronically signed.         Disposition: Home with  self-care    Diet: N.p.o.      Discussed with: Patient in detail      This has been electronically signed by:  _______________________________    Geovanny Paul [email or web address]     At [facility], we believe that sharing information builds trust and better relationships. You are receiving this note because you recently visited [facility]. It is possible you will see health information before a provider has talked with you about it. This kind of information can be easy to misunderstand. To help you fully understand what it means for your health, we urge you to discuss this note with your provider.           Part of this note may be an electronic transcription/translation of spoken language to printed ,text using the Dragon Dictation System.

## 2024-08-22 NOTE — PLAN OF CARE
Goal Outcome Evaluation:  Plan of Care Reviewed With: patient        Progress: no change  Outcome Evaluation: Patient rested quietly throughout shift. NPO since midnight for EGD today. No new issues or complaints. VSS.

## 2024-08-22 NOTE — ANESTHESIA PREPROCEDURE EVALUATION
Anesthesia Evaluation     Patient summary reviewed and Nursing notes reviewed   NPO Solid Status: > 8 hours  NPO Liquid Status: > 2 hours           Airway   Mallampati: I  TM distance: >3 FB  Neck ROM: full  No difficulty expected  Dental - normal exam     Pulmonary - negative pulmonary ROS and normal exam    breath sounds clear to auscultation  Cardiovascular - negative cardio ROS and normal exam    Rhythm: regular  Rate: normal    (+) hyperlipidemia      Neuro/Psych- negative ROS  (+) psychiatric history Anxiety  GI/Hepatic/Renal/Endo - negative ROS   (+) GI bleeding , thyroid problem hypothyroidism and hyperthyroidism    Musculoskeletal (-) negative ROS    Abdominal  - normal exam    Bowel sounds: normal.   Substance History - negative use     OB/GYN negative ob/gyn ROS         Other      history of cancer                Anesthesia Plan    ASA 2     general   total IV anesthesia  (Total IV Anesthesia    Patient understands anesthesia not responsible for dental damage.  )  intravenous induction     Anesthetic plan, risks, benefits, and alternatives have been provided, discussed and informed consent has been obtained with: patient.    Plan discussed with CRNA.    CODE STATUS:    Level Of Support Discussed With: Patient  Code Status (Patient has no pulse and is not breathing): CPR (Attempt to Resuscitate)  Medical Interventions (Patient has pulse or is breathing): Full Support

## 2024-08-22 NOTE — ANESTHESIA POSTPROCEDURE EVALUATION
Patient: Jessie Peck    Procedure Summary       Date: 08/22/24 Room / Location: [facility] ENDOSCOPY 5 / [facility] ENDOSCOPY    Anesthesia Start: 1123 Anesthesia Stop: 1152    Procedure: ESOPHAGOGASTRODUODENOSCOPY WITH APC Diagnosis:       Acute upper GI bleed      Melena      Prerenal azotemia      (Acute upper GI bleed [K92.2])      (Melena [K92.1])      (Prerenal azotemia [R79.89])    Surgeons: Ferdinand Paz MD Provider: Zev Mata CRNA    Anesthesia Type: general ASA Status: 2            Anesthesia Type: general    Vitals  Vitals Value Taken Time   /80 08/22/24 1215   Temp 36.2 °C (97.2 °F) 08/22/24 1210   Pulse 55 08/22/24 1217   Resp 14 08/22/24 1210   SpO2 100 % 08/22/24 1217   Vitals shown include unfiled device data.        Post Anesthesia Care and Evaluation    Patient location during evaluation: PHASE II  Patient participation: complete - patient participated  Level of consciousness: awake and awake and alert  Pain management: satisfactory to patient    Airway patency: patent  Anesthetic complications: No anesthetic complications  PONV Status: none  Cardiovascular status: acceptable, hemodynamically stable and stable  Respiratory status: acceptable, nonlabored ventilation and spontaneous ventilation  Hydration status: acceptable    Comments: Pt awake and participating in care, v/s hemodynamically stable, no distress noted, ok for discharge back to the floor  No anesthesia care post op

## 2024-08-22 NOTE — PLAN OF CARE
Goal Outcome Evaluation:  Plan of Care Reviewed With: patient, spouse        Progress: improving  Outcome Evaluation: No BM today, tolerating regular diet following EGD, up to bathroom, no c/o pain or discomfort.

## 2024-08-23 ENCOUNTER — READMISSION MANAGEMENT (OUTPATIENT)
Dept: CALL CENTER | Facility: HOSPITAL | Age: 69
End: 2024-08-23
Payer: MEDICARE

## 2024-08-23 VITALS
WEIGHT: 145.72 LBS | BODY MASS INDEX: 23.42 KG/M2 | HEIGHT: 66 IN | OXYGEN SATURATION: 99 % | HEART RATE: 71 BPM | RESPIRATION RATE: 16 BRPM | DIASTOLIC BLOOD PRESSURE: 87 MMHG | SYSTOLIC BLOOD PRESSURE: 128 MMHG | TEMPERATURE: 98.2 F

## 2024-08-23 LAB
ALBUMIN SERPL-MCNC: 3.5 G/DL (ref 3.5–5.2)
ALBUMIN/GLOB SERPL: 1.8 G/DL
ALP SERPL-CCNC: 39 U/L (ref 39–117)
ALT SERPL W P-5'-P-CCNC: 6 U/L (ref 1–33)
ANION GAP SERPL CALCULATED.3IONS-SCNC: 6.7 MMOL/L (ref 5–15)
AST SERPL-CCNC: 14 U/L (ref 1–32)
BILIRUB SERPL-MCNC: 0.2 MG/DL (ref 0–1.2)
BUN SERPL-MCNC: 12 MG/DL (ref 8–23)
BUN/CREAT SERPL: 13.5 (ref 7–25)
CALCIUM SPEC-SCNC: 8.8 MG/DL (ref 8.6–10.5)
CHLORIDE SERPL-SCNC: 106 MMOL/L (ref 98–107)
CO2 SERPL-SCNC: 28.3 MMOL/L (ref 22–29)
CREAT SERPL-MCNC: 0.89 MG/DL (ref 0.57–1)
DEPRECATED RDW RBC AUTO: 44.5 FL (ref 37–54)
EGFRCR SERPLBLD CKD-EPI 2021: 71.2 ML/MIN/1.73
ERYTHROCYTE [DISTWIDTH] IN BLOOD BY AUTOMATED COUNT: 13 % (ref 12.3–15.4)
GLOBULIN UR ELPH-MCNC: 2 GM/DL
GLUCOSE SERPL-MCNC: 100 MG/DL (ref 65–99)
HCT VFR BLD AUTO: 27.8 % (ref 34–46.6)
HCT VFR BLD AUTO: 29.3 % (ref 34–46.6)
HGB BLD-MCNC: 9 G/DL (ref 12–15.9)
HGB BLD-MCNC: 9.8 G/DL (ref 12–15.9)
MCH RBC QN AUTO: 30.6 PG (ref 26.6–33)
MCHC RBC AUTO-ENTMCNC: 32.4 G/DL (ref 31.5–35.7)
MCV RBC AUTO: 94.6 FL (ref 79–97)
PLATELET # BLD AUTO: 191 10*3/MM3 (ref 140–450)
PMV BLD AUTO: 10.5 FL (ref 6–12)
POTASSIUM SERPL-SCNC: 4.1 MMOL/L (ref 3.5–5.2)
PROT SERPL-MCNC: 5.5 G/DL (ref 6–8.5)
RBC # BLD AUTO: 2.94 10*6/MM3 (ref 3.77–5.28)
SODIUM SERPL-SCNC: 141 MMOL/L (ref 136–145)
WBC NRBC COR # BLD AUTO: 4.25 10*3/MM3 (ref 3.4–10.8)

## 2024-08-23 PROCEDURE — 85014 HEMATOCRIT: CPT | Performed by: INTERNAL MEDICINE

## 2024-08-23 PROCEDURE — 85027 COMPLETE CBC AUTOMATED: CPT | Performed by: INTERNAL MEDICINE

## 2024-08-23 PROCEDURE — 36415 COLL VENOUS BLD VENIPUNCTURE: CPT | Performed by: INTERNAL MEDICINE

## 2024-08-23 PROCEDURE — 80053 COMPREHEN METABOLIC PANEL: CPT | Performed by: INTERNAL MEDICINE

## 2024-08-23 PROCEDURE — 99239 HOSP IP/OBS DSCHRG MGMT >30: CPT | Performed by: INTERNAL MEDICINE

## 2024-08-23 PROCEDURE — 85018 HEMOGLOBIN: CPT | Performed by: INTERNAL MEDICINE

## 2024-08-23 PROCEDURE — 25810000003 LACTATED RINGERS PER 1000 ML: Performed by: INTERNAL MEDICINE

## 2024-08-23 PROCEDURE — G0378 HOSPITAL OBSERVATION PER HR: HCPCS

## 2024-08-23 RX ORDER — PANTOPRAZOLE SODIUM 40 MG/1
40 TABLET, DELAYED RELEASE ORAL DAILY
Qty: 39 TABLET | Refills: 0 | Status: SHIPPED | OUTPATIENT
Start: 2024-08-23

## 2024-08-23 RX ORDER — TRAMADOL HYDROCHLORIDE 50 MG/1
50 TABLET ORAL EVERY 6 HOURS PRN
Qty: 12 TABLET | Refills: 0 | Status: SHIPPED | OUTPATIENT
Start: 2024-08-23 | End: 2024-08-26

## 2024-08-23 RX ORDER — SUCRALFATE 1 G/1
1 TABLET ORAL
Qty: 120 TABLET | Refills: 2 | Status: SHIPPED | OUTPATIENT
Start: 2024-08-23 | End: 2024-11-20

## 2024-08-23 RX ADMIN — SUCRALFATE 1 G: 1 TABLET ORAL at 08:09

## 2024-08-23 RX ADMIN — SUCRALFATE 1 G: 1 TABLET ORAL at 11:21

## 2024-08-23 RX ADMIN — SODIUM CHLORIDE, POTASSIUM CHLORIDE, SODIUM LACTATE AND CALCIUM CHLORIDE 1000 ML: 600; 310; 30; 20 INJECTION, SOLUTION INTRAVENOUS at 05:01

## 2024-08-23 RX ADMIN — LEVOTHYROXINE SODIUM 125 MCG: 75 TABLET ORAL at 05:00

## 2024-08-23 RX ADMIN — PANTOPRAZOLE SODIUM 40 MG: 40 INJECTION, POWDER, FOR SOLUTION INTRAVENOUS at 08:09

## 2024-08-23 NOTE — OUTREACH NOTE
Prep Survey      Flowsheet Row Responses   [facility] patient discharged from? [facility]   Is LACE score < 7 ? Yes   Eligibility [facility]   Date of Admission 08/21/24   Date of Discharge 08/23/24   Discharge Disposition Home or Self Care   Discharge diagnosis Melena  [EGD with APC this visit.]   Does the patient have one of the following disease processes/diagnoses(primary or secondary)? Other   Does the patient have Home health ordered? No   Is there a DME ordered? No   Comments regarding appointments Discharge Follow-up with Specified Provider: Gastroenterology,  2 Weeks   Medication alerts for this patient see AVS   Prep survey completed? Yes            Vicky WILLS - Registered Nurse

## 2024-08-23 NOTE — PLAN OF CARE
Goal Outcome Evaluation:  Plan of Care Reviewed With: patient        Progress: improving  Outcome Evaluation: Hgb 9 with am labs. No significant changes overnight. Pt hopes to discharge today.

## 2024-08-23 NOTE — DISCHARGE SUMMARY
[facility] MEDICINE DISCHARGE SUMMARY    Patient Identification:  Name:  Jessie Peck  Age:  69 y.o.  Sex:  female  :  1955  MRN:  [id number]  Visit Number:  [id number]    Date of Admission: 2024  Date of Discharge:  2024     PCP: Percy Duke APRN    Admitting diagnosis:    Fall    Melena      DISCHARGE DIAGNOSIS  Mechanical fall:  Fall precautions  PT OT     GI bleed with melena:Erosive gastropathy with no bleeding and no stigmata of recent bleeding. - Non-bleeding gastric ulcers with a clean ulcer base (Elfego Class III). - Duodenal erosions with stigmata of recent bleeding     Fracture lateral eighth and ninth rib:    Hypothyroidism:     Hyperlipidemia       HOSPITAL COURSE     69 y.o. female with a past medical history of hypothyroidism, hyperlipidemia, internal hemorrhoids, diverticulosis of the sigmoid colon presented to the ED for evaluation of right-sided chest wall injury, black-colored stools.  Approximately a week ago patient was sitting in a chair that broke and she fell onto her right side hitting onto the right posterior lateral rib region.  Since then patient has been having pain in that region and has been taking ibuprofen 800 mg thrice a day for the past 5 days for pain control.  Yesterday patient was trying to pick something from the storage and she felt excruciating pain in her right lateral hip region and after that patient noticed having nausea and dizziness, had an episode of vomiting and noticed slight black-colored vomitus.  Later in the evening had an episode of tarry black-colored stools.  Denies any similar episodes in the past.  Not on iron pills.  Occasional drinks alcohol.  Denies any smoking.  Prior to this episode intermittently she used to use ibuprofen as needed.  Denies any fevers, chills, shortness of breath.     Emergency room course.     In the ED, vital signs temperature 97.8, pulse 70, respiratory 16, blood pressure 135/88 on  room air saturating at 100%.  Labs showed CMP with no significant findings, normal lactic acid, normal lipase, normal WBC, hemoglobin 11.1, hemoglobin A1c was 13.9, platelets 268 urinalysis is noninfectious.  Stool occult negative.  Chest x-ray showed right lateral eighth and ninth rib fractures with no pneumothorax.  CT abdomen pelvis with contrast showed low-attenuation lesions within the liver are similar to the prior study and compatible with cyst.  There is no evidence of acute traumatic abnormality of the liver or other solid organs within the right upper quadrant.  Incidental note of cystic lesions within the uncinate process of the pancreas.  Finding may represent dilatation of the pancreatic duct in the region or other cystic lesions.  Consider follow-up on a nonemergent basis with MRI of the abdomen with contrast.  Noncalcified nodule on the left lung base.  Received IV fluids and IV Protonix in the ED.  Case has been discussed by the ED physician with the gastroenterologist on-call Dr. Paz, recommended to keep the patient n.p.o. and will be consulted.  Patient has been admitted for further evaluation and management of melena.    Mechanical fall:  Fall precautions  PT OT     GI bleed with melena:Erosive gastropathy with no bleeding and no stigmata of recent bleeding. - Non-bleeding gastric ulcers with a clean ulcer base (Elfego Class III). - Duodenal erosions with stigmata of recent bleeding  S/p EGD  Continue Protonix  Monitor H&H.     Fracture lateral eighth and ninth rib:  Continue current pain control.     Hypothyroidism:  Continue replacement     Hyperlipidemia  Continue statins       Consultants:    Gastroenterology        Procedures:  EGD     Anti-infectives:    CT abdomen with contrast  Chest x-ray    VITAL SIGNS:      08/21/24  0442   Weight: 66.1 kg (145 lb 11.6 oz)     Body mass index is 23.52 kg/m².    PHYSICAL EXAM:    Constitutional: Awake, alert, no acute distress              Eyes: Pupils  equal, sclerae anicteric, no conjunctival injection              HENT: NCAT, mucous membranes moist              Neck: Supple, no thyromegaly, no lymphadenopathy, trachea midline              Respiratory: Clear to auscultation bilaterally, nonlabored respirations               Cardiovascular: RRR, no murmurs, rubs, or gallops              Gastrointestinal: soft, nontender, nondistended              Musculoskeletal: No bilateral ankle edema, no clubbing or cyanosis to extremities.  Left upper extremity arm in sling.              Psychiatric: Appropriate affect, cooperative              Neurologic: Oriented x 3, strength symmetric in all extremities, Cranial Nerves grossly intact to confrontation, speech clear              Skin: No rashes         DISCHARGE DISPOSITION   Stable    Discharge to: Home with self-care    Activity: As tolerated    Follow-up: PMD and gastroenterology 1 week        DISCHARGE MEDICATIONS:     Discharge Medications        New Medications        Instructions Start Date   pantoprazole 40 MG EC tablet  Commonly known as: Protonix   40 mg, Oral, Daily      sucralfate 1 g tablet  Commonly known as: CARAFATE   1 g, Oral, 4 Times Daily Before Meals & Nightly      traMADol 50 MG tablet  Commonly known as: ULTRAM   50 mg, Oral, Every 6 Hours PRN             Continue These Medications        Instructions Start Date   calcium carbonate 1250 (500 Ca) MG tablet  Commonly known as: OS-YEYO   1,000 mg, Oral, Daily      estrogen (conjugated)-medroxyprogesterone 0.3-1.5 MG per tablet  Commonly known as: PREMPRO   1 tablet, Oral, Daily      levothyroxine 125 MCG tablet  Commonly known as: SYNTHROID, LEVOTHROID   125 mcg, Oral, Every Early Morning      multivitamin with minerals tablet tablet   1 tablet, Oral, Daily               Diet Instructions       Diet: Regular/House Diet; Regular (IDDSI 7); Nectar Thick      Discharge Diet: Regular/House Diet    Texture: Regular (IDDSI 7)    Fluid Consistency: Nectar Thick           Your Scheduled Appointments      Jan 17, 2025 8:45 AM  Office Visit with TREVER Cheng  [facility] ([place]) [address]  [address]  [place] [place] [address]  [phone]   Arrive 15 minutes prior to appointment.  If you are in need of a language or hearing  please call the Department.              Activity Instructions       Activity as Tolerated            Additional Instructions for the Follow-ups that You Need to Schedule       Call MD With Problems / Concerns   As directed      Instructions: Any GI bleeding    Order Comments: Instructions: Any GI bleeding         Discharge Follow-up with PCP   As directed       Currently Documented PCP:    Percy Duke APRN    PCP Phone Number:    [phone]     Follow Up Details: Percy Duke APRN PCP Phone Number: [phone] follow-up status post EGD and peptic ulcer disease        Discharge Follow-up with Specified Provider: Gastroenterology; 2 Weeks   As directed      To: Gastroenterology   Follow Up: 2 Weeks               Follow-up Information       Percy Duke APRN .    Specialties: Nurse Practitioner, Family Medicine  Why: Percy Duke APRN PCP Phone Number: [phone] follow-up status post EGD and peptic ulcer disease  Contact information:  [address]  [address]  [place] [address]  [phone]                             TEST  RESULTS PENDING AT DISCHARGE       Geovanny Sanon MD  08/23/24  11:53 EDT    Geovanny Sanon MD MD.APRYL [email or web address].    Please note that this discharge summary required more than 30 minutes to complete.    Please send a copy of this dictation to the foll    At [facility], we believe that sharing information builds trust and better relationships. You are receiving this note because you recently visited [facility]. It is possible you will see health information before a provider has talked with you about it. This kind of information can be easy to misunderstand. To  help you fully understand what it means for your health, we urge you to discuss this note with your provider.       Part of this note may be an electronic transcription/translation of spoken language to printed            text using the Dragon Dictation System.

## 2024-08-25 ENCOUNTER — TRANSITIONAL CARE MANAGEMENT TELEPHONE ENCOUNTER (OUTPATIENT)
Dept: CALL CENTER | Facility: HOSPITAL | Age: 69
End: 2024-08-25
Payer: MEDICARE

## 2024-08-25 NOTE — OUTREACH NOTE
Call Center TCM Note      Flowsheet Row Responses   [facility] patient discharged from? [facility]   Does the patient have one of the following disease processes/diagnoses(primary or secondary)? Other   TCM attempt successful? Yes   Call start time 1023   Call end time 1032   List who call center can speak with pt   Meds reviewed with patient/caregiver? Yes   Is the patient having any side effects they believe may be caused by any medication additions or changes? No   Does the patient have all medications ordered at discharge? Yes   Is the patient taking all medications as directed (includes completed medication regime)? Yes   Comments Pt reports having made f/u with pcp, unsure of the date.   Does the patient have an appointment with their PCP within 7-14 days of discharge? Yes   Has home health visited the patient within 72 hours of discharge? N/A   Psychosocial issues? No   Did the patient receive a copy of their discharge instructions? Yes   Nursing interventions Reviewed instructions with patient   What is the patient's perception of their health status since discharge? Improving   Is the patient/caregiver able to teach back signs and symptoms related to disease process for when to call PCP? Yes   Is the patient/caregiver able to teach back signs and symptoms related to disease process for when to call 911? Yes   Is the patient/caregiver able to teach back the hierarchy of who to call/visit for symptoms/problems? PCP, Specialist, Home health nurse, Urgent Care, ED, 911 Yes   TCM call completed? Yes   Wrap up additional comments Pt is improving. Pt reports some pain in the rib area. Pt understands this is normal. Pt has all medications.   Call end time 1032   Would this patient benefit from a Referral to Amb Social Work? No   Is the patient interested in additional calls from an ambulatory ? No            lEsi Pereira RN    8/25/2024, 10:33 EDT

## 2024-08-27 ENCOUNTER — TELEPHONE (OUTPATIENT)
Dept: GASTROENTEROLOGY | Facility: CLINIC | Age: 69
End: 2024-08-27
Payer: MEDICARE

## 2024-08-27 ENCOUNTER — PREP FOR SURGERY (OUTPATIENT)
Dept: OTHER | Facility: HOSPITAL | Age: 69
End: 2024-08-27
Payer: MEDICARE

## 2024-08-27 DIAGNOSIS — K31.89 EROSIVE GASTROPATHY: ICD-10-CM

## 2024-08-27 DIAGNOSIS — Z87.19 HISTORY OF GI BLEED: Primary | ICD-10-CM

## 2024-08-27 DIAGNOSIS — Z87.19 HISTORY OF GASTROINTESTINAL ULCER: ICD-10-CM

## 2024-08-27 NOTE — TELEPHONE ENCOUNTER
----- Message from Ferdinand Paz sent at 8/27/2024 10:21 AM EDT -----  No further follow-up  ----- Message -----  From: Serenity Eduardo MA  Sent: 8/27/2024   8:55 AM EDT  To: Ferdinand Paz MD    Inpatient - I have created a case request for 3 month repeat EGD and will get her scheduled when I call. She has an office visit for follow up on 9.27 with you. Does she need to keep this follow up as well??

## 2024-09-03 PROBLEM — Z87.19 HISTORY OF GI BLEED: Status: ACTIVE | Noted: 2024-08-27

## 2024-09-03 PROBLEM — K31.89 EROSIVE GASTROPATHY: Status: ACTIVE | Noted: 2024-08-27

## 2024-09-03 PROBLEM — Z87.19 HISTORY OF GASTROINTESTINAL ULCER: Status: ACTIVE | Noted: 2024-08-27

## 2024-09-26 ENCOUNTER — PATIENT MESSAGE (OUTPATIENT)
Dept: FAMILY MEDICINE CLINIC | Facility: CLINIC | Age: 69
End: 2024-09-26

## 2024-09-26 ENCOUNTER — OFFICE VISIT (OUTPATIENT)
Dept: FAMILY MEDICINE CLINIC | Facility: CLINIC | Age: 69
End: 2024-09-26
Payer: MEDICARE

## 2024-09-26 VITALS
OXYGEN SATURATION: 100 % | TEMPERATURE: 98.1 F | SYSTOLIC BLOOD PRESSURE: 126 MMHG | WEIGHT: 148.2 LBS | HEIGHT: 66 IN | BODY MASS INDEX: 23.82 KG/M2 | DIASTOLIC BLOOD PRESSURE: 78 MMHG | HEART RATE: 69 BPM | RESPIRATION RATE: 16 BRPM

## 2024-09-26 DIAGNOSIS — K86.2 CYST OF PANCREAS: Primary | ICD-10-CM

## 2024-09-26 PROCEDURE — 1160F RVW MEDS BY RX/DR IN RCRD: CPT | Performed by: NURSE PRACTITIONER

## 2024-09-26 PROCEDURE — 1125F AMNT PAIN NOTED PAIN PRSNT: CPT | Performed by: NURSE PRACTITIONER

## 2024-09-26 PROCEDURE — 1159F MED LIST DOCD IN RCRD: CPT | Performed by: NURSE PRACTITIONER

## 2024-09-26 PROCEDURE — 99213 OFFICE O/P EST LOW 20 MIN: CPT | Performed by: NURSE PRACTITIONER

## 2024-11-04 ENCOUNTER — PATIENT MESSAGE (OUTPATIENT)
Dept: FAMILY MEDICINE CLINIC | Facility: CLINIC | Age: 69
End: 2024-11-04
Payer: MEDICARE

## 2024-11-05 ENCOUNTER — TELEPHONE (OUTPATIENT)
Dept: GASTROENTEROLOGY | Facility: CLINIC | Age: 69
End: 2024-11-05
Payer: MEDICARE

## 2024-11-05 ENCOUNTER — TELEPHONE (OUTPATIENT)
Dept: FAMILY MEDICINE CLINIC | Facility: CLINIC | Age: 69
End: 2024-11-05
Payer: MEDICARE

## 2024-11-05 NOTE — TELEPHONE ENCOUNTER
PT STOPPED BY THE OFFICE THIS MORNING TO SEE IF SHE COULD BE SEEN TODAY. THE PT HAS STOMACH ULCERS AND IS HAVING COMPLICATIONS FROM THE MEDICATION THAT DR TIRADO PRESCRIBED FOR HER. PT HAS BEEN UNABLE TO CONTACT DR TIRADO BUT WILL TRY AGAIN TODAY. PT WANTING TO KNOW IF SHE SHOULD DISCONTINUE THE MEDICATION.

## 2024-11-05 NOTE — TELEPHONE ENCOUNTER
Called Madison, left message to RTC.     HUB TO RELAY: Percy is out of office today and she needs to speak with the provider who prescribed the medication or go to the nearest urgent care for evaluation.

## 2024-11-05 NOTE — TELEPHONE ENCOUNTER
Pt reached out to Brandi Fang's office and discussed medication. Please see TE regarding message from gastro's office.

## 2024-11-07 ENCOUNTER — HOSPITAL ENCOUNTER (OUTPATIENT)
Dept: MRI IMAGING | Facility: HOSPITAL | Age: 69
Discharge: HOME | End: 2024-11-07
Admitting: NURSE PRACTITIONER
Payer: MEDICARE

## 2024-11-07 DIAGNOSIS — K86.2 CYST OF PANCREAS: ICD-10-CM

## 2024-11-07 LAB
CREAT BLDA-MCNC: 1 MG/DL (ref 0.6–1.3)
EGFRCR SERPLBLD CKD-EPI 2021: 61.9 ML/MIN/1.73

## 2024-11-07 PROCEDURE — 82565 ASSAY OF CREATININE: CPT

## 2024-11-07 PROCEDURE — 74183 MRI ABD W/O CNTR FLWD CNTR: CPT

## 2024-11-07 PROCEDURE — A9577 INJ MULTIHANCE: HCPCS | Performed by: NURSE PRACTITIONER

## 2024-11-07 PROCEDURE — 0 GADOBENATE DIMEGLUMINE 529 MG/ML SOLUTION: Performed by: NURSE PRACTITIONER

## 2024-11-07 RX ADMIN — GADOBENATE DIMEGLUMINE 15 ML: 529 INJECTION, SOLUTION INTRAVENOUS at 16:54

## 2024-11-14 ENCOUNTER — ANESTHESIA EVENT (OUTPATIENT)
Dept: GASTROENTEROLOGY | Facility: HOSPITAL | Age: 69
End: 2024-11-14
Payer: MEDICARE

## 2024-11-14 NOTE — ANESTHESIA PREPROCEDURE EVALUATION
Anesthesia Evaluation     Patient summary reviewed and Nursing notes reviewed   NPO Solid Status: > 8 hours  NPO Liquid Status: > 8 hours           Airway   Mallampati: I  TM distance: >3 FB  Neck ROM: full  No difficulty expected  Dental - normal exam     Pulmonary - normal exam    breath sounds clear to auscultation  Cardiovascular - normal exam  Exercise tolerance: good (4-7 METS)    Rhythm: regular  Rate: normal    (+) hyperlipidemia      Neuro/Psych  (+) psychiatric history Anxiety  GI/Hepatic/Renal/Endo    (+) GI bleeding upper resolved, thyroid problem hypothyroidism and hyperthyroidism    Musculoskeletal     Abdominal    Substance History   (+) alcohol use (1-2 drinks per day)     OB/GYN          Other      history of cancer (Skin- remission)    ROS/Med Hx Other: Hx GI bleed , duodenal ulcer resolved in September 2024 08/23/24 04:23  Sodium: 141  Potassium: 4.1  Chloride: 106  CO2: 28.3  Anion Gap: 6.7  BUN: 12  Creatinine: 0.89  BUN/Creatinine Ratio: 13.5    WBC: 4.25  RBC: 2.94 (L)  Hemoglobin: 9.0 (L)  Hematocrit: 27.8 (L)  Platelets: 191    EKG in 2023                 Anesthesia Plan    ASA 2     general   total IV anesthesia  (Total IV Anesthesia    Patient understands anesthesia not responsible for dental damage.  )  intravenous induction     Anesthetic plan, risks, benefits, and alternatives have been provided, discussed and informed consent has been obtained with: patient.  Pre-procedure education provided  Plan discussed with CRNA.      CODE STATUS:

## 2024-11-18 ENCOUNTER — HOSPITAL ENCOUNTER (OUTPATIENT)
Facility: HOSPITAL | Age: 69
Setting detail: HOSPITAL OUTPATIENT SURGERY
Discharge: HOME | End: 2024-11-18
Attending: INTERNAL MEDICINE | Admitting: INTERNAL MEDICINE
Payer: MEDICARE

## 2024-11-18 ENCOUNTER — PATIENT MESSAGE (OUTPATIENT)
Dept: FAMILY MEDICINE CLINIC | Facility: CLINIC | Age: 69
End: 2024-11-18
Payer: MEDICARE

## 2024-11-18 ENCOUNTER — ANESTHESIA (OUTPATIENT)
Dept: GASTROENTEROLOGY | Facility: HOSPITAL | Age: 69
End: 2024-11-18
Payer: MEDICARE

## 2024-11-18 VITALS
SYSTOLIC BLOOD PRESSURE: 140 MMHG | BODY MASS INDEX: 23.28 KG/M2 | OXYGEN SATURATION: 98 % | RESPIRATION RATE: 16 BRPM | HEART RATE: 62 BPM | WEIGHT: 144.18 LBS | DIASTOLIC BLOOD PRESSURE: 89 MMHG | TEMPERATURE: 96.9 F

## 2024-11-18 DIAGNOSIS — Z87.19 HISTORY OF GI BLEED: ICD-10-CM

## 2024-11-18 DIAGNOSIS — K31.89 EROSIVE GASTROPATHY: ICD-10-CM

## 2024-11-18 DIAGNOSIS — R79.89 ABNORMAL CBC: Primary | ICD-10-CM

## 2024-11-18 DIAGNOSIS — Z87.19 HISTORY OF GASTROINTESTINAL ULCER: ICD-10-CM

## 2024-11-18 PROCEDURE — 25010000002 PROPOFOL 10 MG/ML EMULSION: Performed by: NURSE ANESTHETIST, CERTIFIED REGISTERED

## 2024-11-18 PROCEDURE — 25010000002 LIDOCAINE PF 2% 2 % SOLUTION: Performed by: NURSE ANESTHETIST, CERTIFIED REGISTERED

## 2024-11-18 PROCEDURE — 43239 EGD BIOPSY SINGLE/MULTIPLE: CPT | Performed by: INTERNAL MEDICINE

## 2024-11-18 PROCEDURE — 88305 TISSUE EXAM BY PATHOLOGIST: CPT | Performed by: INTERNAL MEDICINE

## 2024-11-18 PROCEDURE — 25010000002 PROPOFOL 500 MG/50ML EMULSION: Performed by: NURSE ANESTHETIST, CERTIFIED REGISTERED

## 2024-11-18 PROCEDURE — 43270 EGD LESION ABLATION: CPT | Performed by: INTERNAL MEDICINE

## 2024-11-18 RX ORDER — FAMOTIDINE 20 MG/1
40 TABLET, FILM COATED ORAL 2 TIMES DAILY
Qty: 60 TABLET | Refills: 2 | Status: SHIPPED | OUTPATIENT
Start: 2024-11-18 | End: 2024-11-18 | Stop reason: HOSPADM

## 2024-11-18 RX ORDER — PANTOPRAZOLE SODIUM 40 MG/1
40 TABLET, DELAYED RELEASE ORAL DAILY
Qty: 90 TABLET | Refills: 1 | Status: SHIPPED | OUTPATIENT
Start: 2024-11-18 | End: 2025-05-17

## 2024-11-18 RX ORDER — PROPOFOL 10 MG/ML
VIAL (ML) INTRAVENOUS AS NEEDED
Status: DISCONTINUED | OUTPATIENT
Start: 2024-11-18 | End: 2024-11-18 | Stop reason: SURG

## 2024-11-18 RX ORDER — SODIUM CHLORIDE, SODIUM LACTATE, POTASSIUM CHLORIDE, CALCIUM CHLORIDE 600; 310; 30; 20 MG/100ML; MG/100ML; MG/100ML; MG/100ML
30 INJECTION, SOLUTION INTRAVENOUS CONTINUOUS
Status: DISCONTINUED | OUTPATIENT
Start: 2024-11-18 | End: 2024-11-18 | Stop reason: HOSPADM

## 2024-11-18 RX ORDER — LIDOCAINE HYDROCHLORIDE 20 MG/ML
INJECTION, SOLUTION EPIDURAL; INFILTRATION; INTRACAUDAL; PERINEURAL AS NEEDED
Status: DISCONTINUED | OUTPATIENT
Start: 2024-11-18 | End: 2024-11-18 | Stop reason: SURG

## 2024-11-18 RX ORDER — PROPOFOL 10 MG/ML
INJECTION, EMULSION INTRAVENOUS CONTINUOUS PRN
Status: DISCONTINUED | OUTPATIENT
Start: 2024-11-18 | End: 2024-11-18 | Stop reason: SURG

## 2024-11-18 RX ADMIN — PROPOFOL 50 MG: 10 INJECTION, EMULSION INTRAVENOUS at 07:37

## 2024-11-18 RX ADMIN — LIDOCAINE HYDROCHLORIDE 30 MG: 20 INJECTION, SOLUTION EPIDURAL; INFILTRATION; INTRACAUDAL; PERINEURAL at 07:37

## 2024-11-18 RX ADMIN — PROPOFOL 150 MCG/KG/MIN: 10 INJECTION, EMULSION INTRAVENOUS at 07:37

## 2024-11-18 NOTE — H&P
Pre Procedure History & Physical    Chief Complaint: Evaluate healing of gastric ulcer    HPI: Patient is 69 years old female underwent EGD on 08/22/2024 with melena and chronic NSAIDs intake and found to have nonobstructing Schatzki's ring, small sized hiatal hernia, erosive gastropathy, nonbleeding gastric ulcers and duodenal erosions that were treated with APC presented today for follow-up EGD to evaluate healing.  Patient does not take any blood thinner or anticoagulant medications.  She is not on PPI and it was discontinued after a month.  About 10 days ago she is stopped using Carafate      Z-line regular, 37 cm from the incisors.   - Non-obstructing Schatzki ring.   - Normal mucosa was found in the proximal esophagus, in the mid esophagus and in the distal esophagus.   - Small hiatal hernia.   - Erosive gastropathy with no bleeding and no stigmata of recent bleeding. -Non-bleeding gastric ulcers with a clean ulcer base (Elfego Class III). - Duodenal erosions with stigmata of recent bleeding. Treated with argon plasma coagulation (APC).    Past Medical History:   Past Medical History:   Diagnosis Date    Anemia 1962    Hospitalized /transfusions    Cancer     Cataract     Surgery to remove November 2022    Ear fullness, bilateral     Ear itching     Hearing loss     Hyperthyroidism     Hypothyroidism 2000??    After Thyroid Ablation    Osteopenia     Skin cancer     Thyroid disorder     Tinnitus of both ears        Past Surgical History:  Past Surgical History:   Procedure Laterality Date    APPENDECTOMY  1967    COLONOSCOPY  2018    COLONOSCOPY N/A 1/17/2024    Procedure: COLONOSCOPY to cecum into terminal ileum with cold biopsies;  Surgeon: Omkar Ponce MD;  [facility] ENDOSCOPY;  Service: Gastroenterology;  Laterality: N/A;  pre: change in bowel habits  post: diverticulosis, proctitis, hemorrhoids    ENDOSCOPY N/A 8/22/2024    Procedure: ESOPHAGOGASTRODUODENOSCOPY WITH APC;  Surgeon: Brandi  Ferdinand Presley MD;  Location: [facility] ENDOSCOPY;  Service: Gastroenterology;  Laterality: N/A;  SCHATSKIS RING, GASTRIC ULCERS, DUODENAL EROSIONS    EYE SURGERY  Nov 2022    Cararacts removed       Family History:  Family History   Problem Relation Age of Onset    Cancer Mother         Cancer on a tumor at top of her stomach. Tumor removed. Cancer never came back.    Osteoporosis Mother     Arthritis Mother     Stroke Father     Heart disease Father         Triple by-pass; pace maker    Cancer Father         Squamous cell on lip. Cancer removed.    Skin cancer Father 80    Esophageal cancer Maternal Grandfather        Social History:   reports that she has never smoked. She has never been exposed to tobacco smoke. She has never used smokeless tobacco. She reports current alcohol use of about 4.0 - 6.0 standard drinks of alcohol per week. She reports that she does not use drugs.    Medications:   Medications Prior to Admission   Medication Sig Dispense Refill Last Dose/Taking    calcium carbonate (OS-YEYO) 1250 (500 Ca) MG tablet Take 2 tablets by mouth Daily. 180 tablet 3 Past Week    levothyroxine (SYNTHROID, LEVOTHROID) 125 MCG tablet Take 1 tablet by mouth Every Morning. 90 tablet 1 11/17/2024    multivitamin with minerals tablet tablet Take 1 tablet by mouth Daily.   Past Week    estrogen, conjugated,-medroxyprogesterone (PREMPRO) 0.3-1.5 MG per tablet Take 1 tablet by mouth Daily. 90 tablet 3        Allergies:  Protonix [pantoprazole sodium]      Objective     Blood pressure 143/94, pulse 66, temperature 98.3 °F (36.8 °C), temperature source Temporal, resp. rate 16, weight 65.4 kg (144 lb 2.9 oz), SpO2 99%.    Physical Exam   Constitutional: Pt is oriented to person, place, and time and well-developed, well-nourished, and in no distress.   Mouth/Throat: Oropharynx is clear and moist.   Neck: Normal range of motion.   Cardiovascular: Normal rate, regular rhythm and normal heart sounds.    Pulmonary/Chest: Effort  normal and breath sounds normal.   Abdominal: Soft. Nontender  Skin: Skin is warm and dry.   Psychiatric: Mood, memory, affect and judgment normal.     Assessment & Plan     Diagnosis:    Nonobstructing Schatzki's ring  Gastric erosions  Nonbleeding gastric ulcers to evaluate healing    Anticipated Surgical Procedure:    EGD    The risks, benefits, and alternatives of this procedure have been discussed with the patient or the responsible party- the patient understands and agrees to proceed.        Electronically signed by Ferdinand Paz MD, 11/18/24, 7:37 AM EST.

## 2024-11-18 NOTE — ANESTHESIA POSTPROCEDURE EVALUATION
Patient: Jessie Peck    Procedure Summary       Date: 11/18/24 Room / Location: [facility] ENDOSCOPY 1 / [facility] ENDOSCOPY    Anesthesia Start: 0734 Anesthesia Stop: 0758    Procedure: ESOPHAGOGASTRODUODENOSCOPY with bx and apc Diagnosis:       History of GI bleed      History of gastrointestinal ulcer      Erosive gastropathy      (History of GI bleed [Z87.19])      (History of gastrointestinal ulcer [Z87.19])      (Erosive gastropathy [K31.89])    Surgeons: Ferdinand Paz MD Provider: Paul Chávez CRNA    Anesthesia Type: general ASA Status: 2            Anesthesia Type: general    Vitals  Vitals Value Taken Time   /87 11/18/24 0817   Temp 36.1 °C (96.9 °F) 11/18/24 0812   Pulse 60 11/18/24 0817   Resp 16 11/18/24 0812   SpO2 100 % 11/18/24 0817   Vitals shown include unfiled device data.        Post Anesthesia Care and Evaluation    Post-procedure mental status: acceptable.  Pain management: satisfactory to patient    Airway patency: patent  Anesthetic complications: No anesthetic complications    Cardiovascular status: acceptable  Respiratory status: acceptable    Comments: Per chart review

## 2024-11-19 ENCOUNTER — LAB (OUTPATIENT)
Dept: LAB | Facility: HOSPITAL | Age: 69
End: 2024-11-19
Payer: MEDICARE

## 2024-11-19 DIAGNOSIS — R79.89 ABNORMAL CBC: ICD-10-CM

## 2024-11-19 LAB
CYTO UR: NORMAL
FERRITIN SERPL-MCNC: 37.7 NG/ML (ref 13–150)
IRON 24H UR-MRATE: 40 MCG/DL (ref 37–145)
IRON SATN MFR SERPL: 11 % (ref 20–50)
LAB AP CASE REPORT: NORMAL
LAB AP CLINICAL INFORMATION: NORMAL
PATH REPORT.FINAL DX SPEC: NORMAL
PATH REPORT.GROSS SPEC: NORMAL
TIBC SERPL-MCNC: 380 MCG/DL (ref 298–536)
TRANSFERRIN SERPL-MCNC: 255 MG/DL (ref 200–360)

## 2024-11-19 PROCEDURE — 83540 ASSAY OF IRON: CPT

## 2024-11-19 PROCEDURE — 82728 ASSAY OF FERRITIN: CPT

## 2024-11-19 PROCEDURE — 84466 ASSAY OF TRANSFERRIN: CPT

## 2024-11-19 PROCEDURE — 36415 COLL VENOUS BLD VENIPUNCTURE: CPT

## 2024-11-20 ENCOUNTER — OFFICE VISIT (OUTPATIENT)
Dept: FAMILY MEDICINE CLINIC | Facility: CLINIC | Age: 69
End: 2024-11-20
Payer: MEDICARE

## 2024-11-20 VITALS
TEMPERATURE: 97.5 F | OXYGEN SATURATION: 96 % | DIASTOLIC BLOOD PRESSURE: 82 MMHG | HEART RATE: 87 BPM | BODY MASS INDEX: 22.8 KG/M2 | WEIGHT: 141.2 LBS | SYSTOLIC BLOOD PRESSURE: 140 MMHG | RESPIRATION RATE: 18 BRPM

## 2024-11-20 DIAGNOSIS — Z87.19 HISTORY OF GASTROINTESTINAL ULCER: Primary | ICD-10-CM

## 2024-11-20 RX ORDER — TRIAMCINOLONE ACETONIDE 0.1 %
PASTE (GRAM) DENTAL
COMMUNITY
Start: 2024-10-11

## 2024-11-20 RX ORDER — CALCIUM CARBONATE 500(1250)
TABLET ORAL
COMMUNITY
Start: 2024-10-07

## 2024-11-20 NOTE — PROGRESS NOTES
Chief Complaint  bleeding ulcer (Wanted to discuss endoscopy)    Subjective        Jessie Peck presents to [facility]  History of Present Illness  Bleeding ulcer noted and endoscopy still showed had some bleeding ulcer.  Will remain on medication but didn't do well on carafate.  Didn't have a good experience with Dr. Paz and would like another gastro.  States that the experience there was confrontation about her directions on medication and she had been told by multiple people tell her one direction that was alike and then was reprimanded that she didn't do things correctly.        The following portions of the patient's history were personally reviewed and updated as appropriate: allergies, current medications, past medical history, past surgical history, past family history, and past social history.     Body mass index is 22.8 kg/m².    BMI is within normal parameters. No other follow-up for BMI required.      Past History:    Medical History: has a past medical history of Anemia (1962), Cancer, Cataract, Ear fullness, bilateral, Ear itching, Hearing loss, Hyperthyroidism, Hypothyroidism (2000??), Osteopenia, Skin cancer, Thyroid disorder, and Tinnitus of both ears.     Surgical History: has a past surgical history that includes Appendectomy (1967); Colonoscopy (2018); Eye surgery (Nov 2022); Colonoscopy (N/A, 01/17/2024); Esophagogastroduodenoscopy (N/A, 08/22/2024); and Esophagogastroduodenoscopy (N/A, 11/18/2024).     Family History: family history includes Arthritis in her mother; Cancer in her father and mother; Esophageal cancer in her maternal grandfather; Heart disease in her father; Osteoporosis in her mother; Skin cancer (age of onset: 80) in her father; Stroke in her father.     Social History: reports that she has never smoked. She has never been exposed to tobacco smoke. She has never used smokeless tobacco. She reports current alcohol use of about 4.0 - 6.0  standard drinks of alcohol per week. She reports that she does not use drugs.    Allergies: Patient has no known allergies.          Current Outpatient Medications:     calcium carbonate (OS-YEYO) 1250 (500 Ca) MG tablet, Take 2 tablets by mouth Daily., Disp: 180 tablet, Rfl: 3    estrogen, conjugated,-medroxyprogesterone (PREMPRO) 0.3-1.5 MG per tablet, Take 1 tablet by mouth Daily., Disp: 90 tablet, Rfl: 3    levothyroxine (SYNTHROID, LEVOTHROID) 125 MCG tablet, Take 1 tablet by mouth Every Morning., Disp: 90 tablet, Rfl: 1    multivitamin with minerals tablet tablet, Take 1 tablet by mouth Daily., Disp: , Rfl:     pantoprazole (Protonix) 40 MG EC tablet, Take 1 tablet by mouth Daily for 180 days., Disp: 90 tablet, Rfl: 1    triamcinolone (KENALOG) 0.1 % paste, , Disp: , Rfl:     calcium carbonate, oyster shell, 500 MG tablet tablet, , Disp: , Rfl:     There are no discontinued medications.      Review of Systems   Constitutional:  Negative for fever.   Respiratory:  Negative for cough and shortness of breath.    Cardiovascular:  Negative for chest pain, palpitations and leg swelling.   Neurological:  Negative for dizziness, weakness, numbness and headache.        Objective         Vitals:    11/20/24 1012   BP: 140/82   BP Location: Right arm   Patient Position: Sitting   Cuff Size: Adult   Pulse: 87   Resp: 18   Temp: 97.5 °F (36.4 °C)   TempSrc: Temporal   SpO2: 96%   Weight: 64 kg (141 lb 3.2 oz)     Body mass index is 22.8 kg/m².         Physical Exam  Vitals reviewed.   Constitutional:       Appearance: Normal appearance. She is well-developed.   HENT:      Head: Normocephalic and atraumatic.      Mouth/Throat:      Pharynx: No oropharyngeal exudate.   Eyes:      Conjunctiva/sclera: Conjunctivae normal.      Pupils: Pupils are equal, round, and reactive to light.   Cardiovascular:      Rate and Rhythm: Normal rate and regular rhythm.      Heart sounds: Normal heart sounds. No murmur heard.     No friction  rub. No gallop.   Pulmonary:      Effort: Pulmonary effort is normal.      Breath sounds: Normal breath sounds. No wheezing or rhonchi.   Skin:     General: Skin is warm and dry.   Neurological:      Mental Status: She is alert and oriented to person, place, and time.   Psychiatric:         Mood and Affect: Mood and affect normal.         Behavior: Behavior normal.         Thought Content: Thought content normal.         Judgment: Judgment normal.             Result Review :               Assessment and Plan     Diagnoses and all orders for this visit:    1. History of gastrointestinal ulcer (Primary)  -     Ambulatory Referral to Gastroenterology              Follow Up     Return for Next scheduled follow up.    Patient was given instructions and counseling regarding her condition or for health maintenance advice. Please see specific information pulled into the AVS if appropriate.

## 2024-12-13 ENCOUNTER — TELEPHONE (OUTPATIENT)
Dept: GASTROENTEROLOGY | Facility: CLINIC | Age: 69
End: 2024-12-13
Payer: MEDICARE

## 2024-12-13 NOTE — TELEPHONE ENCOUNTER
"Caller: Jessie Peck \"Madison\"    Relationship: Self    Best call back number: [phone]    Who is your current provider: DR TIRADO    Who would you like your new provider to be: STEVE ROGERS    What are your reasons for transferring care: PATIENT PREFERS NOT TO SEE DR TIRADO    Additional notes: REQUESTING TO HAVE FOLLOW UP WITH FEMALE          "

## 2024-12-16 NOTE — TELEPHONE ENCOUNTER
Just want to make sure patient aware she could see his nurse practitioner Lali in the office for office visits?  But if procedures are needed it would be done by Dr. Jacob.  If patient wants female GI for office visits and procedures she may be better suited with a transfer of care request to Dr. Robison's office

## 2024-12-16 NOTE — TELEPHONE ENCOUNTER
Jessie Peck, 1955, has requested to transfer care from Ferdinand Paz MD to TREVER Patel.    Reason for transfer: Patient would like to see Serenity Arroyo. Pt states she has heard great things, and would like to transfer care. Pt aware that Dr. Hernandez would perform any procedures.     Please review the patients records for possible transfer of care. The patient is aware that it is at the receiving provider's discretion to approve or deny this transfer request.

## 2024-12-17 NOTE — TELEPHONE ENCOUNTER
Transfer of care approved per Serenity Arroyo. I attempted to contact pt to schedule an appointment with Serenity Arroyo. No answer. I left pt a VM to return call with my direct line as a call back number.

## 2025-01-02 ENCOUNTER — PATIENT MESSAGE (OUTPATIENT)
Dept: FAMILY MEDICINE CLINIC | Facility: CLINIC | Age: 70
End: 2025-01-02
Payer: MEDICARE

## 2025-01-02 DIAGNOSIS — Z12.31 ENCOUNTER FOR SCREENING MAMMOGRAM FOR MALIGNANT NEOPLASM OF BREAST: Primary | ICD-10-CM

## 2025-01-13 ENCOUNTER — PATIENT MESSAGE (OUTPATIENT)
Dept: FAMILY MEDICINE CLINIC | Facility: CLINIC | Age: 70
End: 2025-01-13
Payer: MEDICARE

## 2025-01-13 DIAGNOSIS — R79.89 ABNORMAL CBC: Primary | ICD-10-CM

## 2025-01-15 ENCOUNTER — LAB (OUTPATIENT)
Dept: LAB | Facility: HOSPITAL | Age: 70
End: 2025-01-15
Payer: MEDICARE

## 2025-01-15 DIAGNOSIS — E07.9 THYROID DISORDER: ICD-10-CM

## 2025-01-15 DIAGNOSIS — R79.89 ABNORMAL CBC: ICD-10-CM

## 2025-01-15 DIAGNOSIS — E78.00 ELEVATED CHOLESTEROL: ICD-10-CM

## 2025-01-15 DIAGNOSIS — L68.0 HIRSUTISM: ICD-10-CM

## 2025-01-15 LAB
ALBUMIN SERPL-MCNC: 4.3 G/DL (ref 3.5–5.2)
ALBUMIN/GLOB SERPL: 1.7 G/DL
ALP SERPL-CCNC: 47 U/L (ref 39–117)
ALT SERPL W P-5'-P-CCNC: 7 U/L (ref 1–33)
ANION GAP SERPL CALCULATED.3IONS-SCNC: 6.5 MMOL/L (ref 5–15)
AST SERPL-CCNC: 21 U/L (ref 1–32)
BILIRUB SERPL-MCNC: 0.5 MG/DL (ref 0–1.2)
BILIRUB UR QL STRIP: NEGATIVE
BUN SERPL-MCNC: 11 MG/DL (ref 8–23)
BUN/CREAT SERPL: 11.3 (ref 7–25)
CALCIUM SPEC-SCNC: 9.7 MG/DL (ref 8.6–10.5)
CHLORIDE SERPL-SCNC: 104 MMOL/L (ref 98–107)
CHOLEST SERPL-MCNC: 231 MG/DL (ref 0–200)
CLARITY UR: CLEAR
CO2 SERPL-SCNC: 28.5 MMOL/L (ref 22–29)
COLOR UR: YELLOW
CREAT SERPL-MCNC: 0.97 MG/DL (ref 0.57–1)
DEPRECATED RDW RBC AUTO: 50.2 FL (ref 37–54)
EGFRCR SERPLBLD CKD-EPI 2021: 64.2 ML/MIN/1.73
ERYTHROCYTE [DISTWIDTH] IN BLOOD BY AUTOMATED COUNT: 16.1 % (ref 12.3–15.4)
FERRITIN SERPL-MCNC: 40.4 NG/ML (ref 13–150)
GLOBULIN UR ELPH-MCNC: 2.6 GM/DL
GLUCOSE SERPL-MCNC: 93 MG/DL (ref 65–99)
GLUCOSE UR STRIP-MCNC: NEGATIVE MG/DL
HCT VFR BLD AUTO: 41.5 % (ref 34–46.6)
HDLC SERPL QL: 3.73
HDLC SERPL-MCNC: 62 MG/DL (ref 40–60)
HGB BLD-MCNC: 13.4 G/DL (ref 12–15.9)
HGB UR QL STRIP.AUTO: NEGATIVE
HOLD SPECIMEN: NORMAL
IRON 24H UR-MRATE: 99 MCG/DL (ref 37–145)
IRON SATN MFR SERPL: 23 % (ref 20–50)
KETONES UR QL STRIP: NEGATIVE
LDLC SERPL CALC-MCNC: 144 MG/DL (ref 0–100)
LEUKOCYTE ESTERASE UR QL STRIP.AUTO: NEGATIVE
MCH RBC QN AUTO: 27.7 PG (ref 26.6–33)
MCHC RBC AUTO-ENTMCNC: 32.3 G/DL (ref 31.5–35.7)
MCV RBC AUTO: 85.7 FL (ref 79–97)
NITRITE UR QL STRIP: NEGATIVE
PH UR STRIP.AUTO: 6.5 [PH] (ref 5–8)
PLATELET # BLD AUTO: 270 10*3/MM3 (ref 140–450)
PMV BLD AUTO: 10.6 FL (ref 6–12)
POTASSIUM SERPL-SCNC: 4.2 MMOL/L (ref 3.5–5.2)
PROT SERPL-MCNC: 6.9 G/DL (ref 6–8.5)
PROT UR QL STRIP: NEGATIVE
RBC # BLD AUTO: 4.84 10*6/MM3 (ref 3.77–5.28)
SODIUM SERPL-SCNC: 139 MMOL/L (ref 136–145)
SP GR UR STRIP: 1.02 (ref 1–1.03)
TIBC SERPL-MCNC: 425 MCG/DL (ref 298–536)
TRANSFERRIN SERPL-MCNC: 285 MG/DL (ref 200–360)
TRIGL SERPL-MCNC: 140 MG/DL (ref 0–150)
TSH SERPL DL<=0.05 MIU/L-ACNC: 1.85 UIU/ML (ref 0.27–4.2)
UROBILINOGEN UR QL STRIP: NORMAL
VLDLC SERPL-MCNC: 25 MG/DL (ref 5–40)
WBC NRBC COR # BLD AUTO: 4.31 10*3/MM3 (ref 3.4–10.8)

## 2025-01-15 PROCEDURE — 85027 COMPLETE CBC AUTOMATED: CPT

## 2025-01-15 PROCEDURE — 84466 ASSAY OF TRANSFERRIN: CPT

## 2025-01-15 PROCEDURE — 84443 ASSAY THYROID STIM HORMONE: CPT

## 2025-01-15 PROCEDURE — 82728 ASSAY OF FERRITIN: CPT

## 2025-01-15 PROCEDURE — 81003 URINALYSIS AUTO W/O SCOPE: CPT

## 2025-01-15 PROCEDURE — 36415 COLL VENOUS BLD VENIPUNCTURE: CPT

## 2025-01-15 PROCEDURE — 80061 LIPID PANEL: CPT

## 2025-01-15 PROCEDURE — 83540 ASSAY OF IRON: CPT

## 2025-01-15 PROCEDURE — 80053 COMPREHEN METABOLIC PANEL: CPT

## 2025-01-17 ENCOUNTER — OFFICE VISIT (OUTPATIENT)
Dept: FAMILY MEDICINE CLINIC | Facility: CLINIC | Age: 70
End: 2025-01-17
Payer: MEDICARE

## 2025-01-17 VITALS
BODY MASS INDEX: 23.33 KG/M2 | WEIGHT: 145.2 LBS | TEMPERATURE: 97.4 F | HEIGHT: 66 IN | OXYGEN SATURATION: 99 % | DIASTOLIC BLOOD PRESSURE: 66 MMHG | SYSTOLIC BLOOD PRESSURE: 128 MMHG | HEART RATE: 65 BPM

## 2025-01-17 DIAGNOSIS — E78.00 ELEVATED CHOLESTEROL: Primary | ICD-10-CM

## 2025-01-17 DIAGNOSIS — M81.0 AGE-RELATED OSTEOPOROSIS WITHOUT CURRENT PATHOLOGICAL FRACTURE: ICD-10-CM

## 2025-01-17 DIAGNOSIS — E07.9 THYROID DISORDER: ICD-10-CM

## 2025-01-17 DIAGNOSIS — K21.9 GASTROESOPHAGEAL REFLUX DISEASE WITHOUT ESOPHAGITIS: ICD-10-CM

## 2025-01-17 PROCEDURE — 1159F MED LIST DOCD IN RCRD: CPT | Performed by: NURSE PRACTITIONER

## 2025-01-17 PROCEDURE — 1160F RVW MEDS BY RX/DR IN RCRD: CPT | Performed by: NURSE PRACTITIONER

## 2025-01-17 PROCEDURE — 99214 OFFICE O/P EST MOD 30 MIN: CPT | Performed by: NURSE PRACTITIONER

## 2025-01-17 PROCEDURE — 1126F AMNT PAIN NOTED NONE PRSNT: CPT | Performed by: NURSE PRACTITIONER

## 2025-01-17 RX ORDER — IRON BIS-GLYCINAT/VIT C/FA/B12 28-60-0.4
CAPSULE ORAL
COMMUNITY

## 2025-01-17 NOTE — PROGRESS NOTES
Chief Complaint  Hypothyroidism, Heartburn, Peptic Ulcer Disease, and Fatigue    Subjective        Jessie Peck presents to [facility]  History of Present Illness  Osteoporosis:  Would like to to discuss fosamax.    Hypothyroidism:  Doing well on medication and will go get labs.    GERD:  doing well   but has been more tired of late.      Fatigue:  at times not waking up rested but sometimes she is.          Hypothyroidism  Symptoms include fatigue.   Heartburn  She reports no chest pain or no coughing. Associated symptoms include fatigue.   Peptic Ulcer Disease  Symptoms include fatigue.    Pertinent negative symptoms include no chest pain, no cough, no fever, no numbness and no weakness.   Fatigue  Symptoms include fatigue.    Pertinent negative symptoms include no chest pain, no cough, no fever, no numbness and no weakness.   Osteoporosis  Associated symptoms include fatigue. Pertinent negatives include no chest pain, coughing, fever, numbness or weakness.   Leg Pain   Pertinent negatives include no numbness.       The following portions of the patient's history were personally reviewed and updated as appropriate: allergies, current medications, past medical history, past surgical history, past family history, and past social history.     Body mass index is 23.45 kg/m².    BMI is within normal parameters. No other follow-up for BMI required.      Past History:    Medical History: has a past medical history of Anemia (1962), Cancer, Cataract, Ear fullness, bilateral, Ear itching, Hearing loss, Hyperthyroidism, Hypothyroidism (2000??), Osteopenia, Skin cancer, Thyroid disorder, and Tinnitus of both ears.     Surgical History: has a past surgical history that includes Appendectomy (1967); Colonoscopy (2018); Eye surgery (Nov 2022); Colonoscopy (N/A, 01/17/2024); Esophagogastroduodenoscopy (N/A, 08/22/2024); and Esophagogastroduodenoscopy (N/A, 11/18/2024).     Family History: family  history includes Arthritis in her mother; Cancer in her father and mother; Esophageal cancer in her maternal grandfather; Heart disease in her father; Osteoporosis in her mother; Skin cancer (age of onset: 80) in her father; Stroke in her father.     Social History: reports that she has never smoked. She has never been exposed to tobacco smoke. She has never used smokeless tobacco. She reports current alcohol use of about 4.0 - 6.0 standard drinks of alcohol per week. She reports that she does not use drugs.    Allergies: Patient has no known allergies.          Current Outpatient Medications:     calcium carbonate (OS-YEYO) 1250 (500 Ca) MG tablet, Take 2 tablets by mouth Daily., Disp: 180 tablet, Rfl: 3    estrogen, conjugated,-medroxyprogesterone (PREMPRO) 0.3-1.5 MG per tablet, Take 1 tablet by mouth Daily., Disp: 90 tablet, Rfl: 3    Fe Bisgly-Vit C-Vit B12-FA (Gentle Iron) 28-60-0.008-0.4 MG capsule, Take  by mouth., Disp: , Rfl:     levothyroxine (SYNTHROID, LEVOTHROID) 125 MCG tablet, Take 1 tablet by mouth Every Morning., Disp: 90 tablet, Rfl: 1    NON FORMULARY, Citracell, Disp: , Rfl:     pantoprazole (Protonix) 40 MG EC tablet, Take 1 tablet by mouth Daily for 180 days., Disp: 90 tablet, Rfl: 1    triamcinolone (KENALOG) 0.1 % paste, , Disp: , Rfl:     Medications Discontinued During This Encounter   Medication Reason    multivitamin with minerals tablet tablet *Therapy completed    calcium carbonate, oyster shell, 500 MG tablet tablet *Therapy completed         Review of Systems   Constitutional:  Positive for fatigue. Negative for fever.   Respiratory:  Negative for cough.    Cardiovascular:  Negative for chest pain.   Neurological:  Negative for weakness and numbness.        Objective         Vitals:    01/17/25 0848   BP: 128/66   BP Location: Right arm   Patient Position: Sitting   Cuff Size: Adult   Pulse: 65   Temp: 97.4 °F (36.3 °C)   TempSrc: Temporal   SpO2: 99%   Weight: 65.9 kg (145 lb 3.2 oz)  "  Height: 167.6 cm (65.98\")     Body mass index is 23.45 kg/m².         Physical Exam  Vitals reviewed.   Constitutional:       Appearance: Normal appearance. She is well-developed.   HENT:      Head: Normocephalic and atraumatic.      Mouth/Throat:      Pharynx: No oropharyngeal exudate.   Eyes:      Conjunctiva/sclera: Conjunctivae normal.      Pupils: Pupils are equal, round, and reactive to light.   Cardiovascular:      Rate and Rhythm: Normal rate and regular rhythm.      Heart sounds: Normal heart sounds. No murmur heard.     No friction rub. No gallop.   Pulmonary:      Effort: Pulmonary effort is normal.      Breath sounds: Normal breath sounds. No wheezing or rhonchi.   Skin:     General: Skin is warm and dry.   Neurological:      Mental Status: She is alert and oriented to person, place, and time.   Psychiatric:         Mood and Affect: Mood and affect normal.         Behavior: Behavior normal.         Thought Content: Thought content normal.         Judgment: Judgment normal.             Result Review :    The 10-year ASCVD risk score (Chong DK, et al., 2019) is: 7.1%    Values used to calculate the score:      Age: 69 years      Sex: Female      Is Non- : No      Diabetic: No      Tobacco smoker: No      Systolic Blood Pressure: 128 mmHg      Is BP treated: No      HDL Cholesterol: 62 mg/dL      Total Cholesterol: 231 mg/dL             Assessment and Plan     Diagnoses and all orders for this visit:    1. Elevated cholesterol (Primary)  -     Lipid Panel With / Chol / HDL Ratio; Future  -     Comprehensive Metabolic Panel; Future    2. Thyroid disorder  -     TSH; Future    3. Age-related osteoporosis without current pathological fracture    4. Gastroesophageal reflux disease without esophagitis  -     CBC (No Diff); Future  -     Urinalysis With Culture If Indicated -; Future              Follow Up     Return in about 6 months (around 7/17/2025).    Patient was given " instructions and counseling regarding her condition or for health maintenance advice. Please see specific information pulled into the AVS if appropriate.

## 2025-01-20 ENCOUNTER — PATIENT MESSAGE (OUTPATIENT)
Dept: FAMILY MEDICINE CLINIC | Facility: CLINIC | Age: 70
End: 2025-01-20
Payer: MEDICARE

## 2025-01-20 DIAGNOSIS — M81.0 AGE-RELATED OSTEOPOROSIS WITHOUT CURRENT PATHOLOGICAL FRACTURE: Primary | ICD-10-CM

## 2025-01-21 ENCOUNTER — LAB (OUTPATIENT)
Dept: LAB | Facility: HOSPITAL | Age: 70
End: 2025-01-21
Payer: MEDICARE

## 2025-01-21 DIAGNOSIS — M81.0 AGE-RELATED OSTEOPOROSIS WITHOUT CURRENT PATHOLOGICAL FRACTURE: ICD-10-CM

## 2025-01-21 LAB — 25(OH)D3 SERPL-MCNC: 40.9 NG/ML (ref 30–100)

## 2025-01-21 PROCEDURE — 82306 VITAMIN D 25 HYDROXY: CPT

## 2025-01-21 PROCEDURE — 36415 COLL VENOUS BLD VENIPUNCTURE: CPT

## 2025-02-20 DIAGNOSIS — E03.9 HYPOTHYROIDISM, UNSPECIFIED TYPE: ICD-10-CM

## 2025-02-20 RX ORDER — LEVOTHYROXINE SODIUM 125 UG/1
125 TABLET ORAL
Qty: 90 TABLET | Refills: 1 | Status: SHIPPED | OUTPATIENT
Start: 2025-02-20

## 2025-03-19 ENCOUNTER — HOSPITAL ENCOUNTER (OUTPATIENT)
Dept: MAMMOGRAPHY | Facility: HOSPITAL | Age: 70
Discharge: HOME | End: 2025-03-19
Admitting: NURSE PRACTITIONER
Payer: MEDICARE

## 2025-03-19 DIAGNOSIS — Z12.31 ENCOUNTER FOR SCREENING MAMMOGRAM FOR MALIGNANT NEOPLASM OF BREAST: ICD-10-CM

## 2025-03-19 PROCEDURE — 77067 SCR MAMMO BI INCL CAD: CPT

## 2025-03-19 PROCEDURE — 77063 BREAST TOMOSYNTHESIS BI: CPT

## 2025-04-09 ENCOUNTER — TELEPHONE (OUTPATIENT)
Dept: GASTROENTEROLOGY | Facility: CLINIC | Age: 70
End: 2025-04-09

## 2025-04-09 NOTE — TELEPHONE ENCOUNTER
I called and spoke with patient in regards to her pantoprazole. She states she is going stop her pantoprazole and will discuss further at her appointment with Serenity Arroyo.

## 2025-05-05 ENCOUNTER — TELEPHONE (OUTPATIENT)
Dept: GASTROENTEROLOGY | Facility: CLINIC | Age: 70
End: 2025-05-05
Payer: MEDICARE

## 2025-05-05 NOTE — TELEPHONE ENCOUNTER
"  Hub staff attempted to follow warm transfer process and was unsuccessful     Caller: Jessie Peck \"Madison\"    Relationship to patient: Self    Best call back number:   Telephone Information:   Mobile [phone]     Patient is needing: PT RETURNING CALLED TO SCHEDULE APPT FOR 11:15 DUE TO CANCELLATION. PLEASE ADVISE.       "

## 2025-05-22 ENCOUNTER — OFFICE VISIT (OUTPATIENT)
Dept: GASTROENTEROLOGY | Facility: CLINIC | Age: 70
End: 2025-05-22
Payer: MEDICARE

## 2025-05-22 ENCOUNTER — TELEPHONE (OUTPATIENT)
Dept: GASTROENTEROLOGY | Facility: CLINIC | Age: 70
End: 2025-05-22
Payer: MEDICARE

## 2025-05-22 VITALS
BODY MASS INDEX: 23.21 KG/M2 | WEIGHT: 144.4 LBS | SYSTOLIC BLOOD PRESSURE: 144 MMHG | OXYGEN SATURATION: 96 % | DIASTOLIC BLOOD PRESSURE: 79 MMHG | HEIGHT: 66 IN | HEART RATE: 62 BPM

## 2025-05-22 DIAGNOSIS — K55.20 AVM (ARTERIOVENOUS MALFORMATION) OF SMALL BOWEL, ACQUIRED: ICD-10-CM

## 2025-05-22 DIAGNOSIS — K86.2 PANCREATIC CYST: Primary | ICD-10-CM

## 2025-05-22 DIAGNOSIS — Z87.11 HISTORY OF GASTRIC ULCER: ICD-10-CM

## 2025-05-22 DIAGNOSIS — K22.2 SCHATZKI'S RING: ICD-10-CM

## 2025-05-22 RX ORDER — TELMISARTAN 20 MG/1
20 TABLET ORAL DAILY
COMMUNITY
Start: 2025-05-19

## 2025-05-22 RX ORDER — TESTOSTERONE GEL, 1% 10 MG/G
50 GEL TRANSDERMAL DAILY
COMMUNITY
Start: 2025-05-06

## 2025-05-22 RX ORDER — PANTOPRAZOLE SODIUM 20 MG/1
20 TABLET, DELAYED RELEASE ORAL DAILY
Qty: 90 TABLET | Refills: 1 | Status: SHIPPED | OUTPATIENT
Start: 2025-05-22

## 2025-05-22 RX ORDER — ROSUVASTATIN CALCIUM 5 MG/1
5 TABLET, COATED ORAL DAILY
COMMUNITY
Start: 2025-05-19 | End: 2026-05-19

## 2025-05-22 RX ORDER — PANTOPRAZOLE SODIUM 20 MG/1
40 TABLET, DELAYED RELEASE ORAL DAILY
COMMUNITY
End: 2025-05-22

## 2025-05-22 NOTE — TELEPHONE ENCOUNTER
Was calling patients from waitlist, Serenity WILLS had a appointment open today. [facility]

## 2025-05-22 NOTE — PROGRESS NOTES
Patient Name: Jessie Peck   Visit Date: 05/22/2025   Patient ID: [id number]  Provider: TREVER Zambrano    Sex: female  Location:  Location Address:  Location Phone: [address]  [place] [address] [phone]    YOB: 1955  Age: 70 y.o.   Primary Care Provider Percy Duke APRN      Referring Provider: TREVER Cheng        Chief Complaint  History of gastric ulcer (Doing well on PPI)    History of Present Illness    Patient was admitted with melena August 2024, she had had a fall and was taking Ibuprofen 800 mg, she started having hematemesis.  Stool for occult blood was negative    EGD 8/22/2024: Regular Z-line, nonobstructing Schatzki's ring at the GE junction erosions in the stomach body, 3 nonbleeding cratered and superficial gastric ulcers were found in the antrum, erosions with recent bleeding in the duodenal bulb, treated with APC, Carafate was prescribed  EGD 11/18/2024: Regular Z-line, Schatzki's ring at the GE junction normal esophagus, few erosions in the stomach-biopsy with mild gastropathy, a single 2 mm angioectasia with bleeding was found in the duodenal, treated with APC  CT abdomen and pelvis with contrast 8/21/2024 incidental note of cystic lesion within the uncinate process of the pancreas, MRI was recommended  MRI of the abdomen with and without contrast 11/7/2024: Multiple hyperintense cyst within the liver largest measuring 4.7 cm no suspicious liver lesion, within the uncinate process of the pancreas there is a 2 cm cystic structure likely representing a dilated sidebranch arising from the minor pancreatic duct versus a sidebranch type IPMN there is no main duct dilatation    Repeat CBC in Jan was normal - anemia resolved  History of Present Illness  The patient presents for evaluation of a pancreatic cyst, stomach ulcers, and internal hemorrhoids.    She reports a general sense of well-being, with no abdominal discomfort or difficulty in eating. She  has not experienced any hematochezia or melena. Bowel movements are regular, although occasional constipation is noted, which she manages with Metamucil. She is currently on a regimen of pantoprazole 40 mg, no HB. There is a history of ibuprofen use for pain management following rib fractures, but its use has been discontinued due to GI bleeding. Tylenol is now used for pain relief.     A colonoscopy was performed in 01/2024 by Dr. Ponce, revealing mild rectal inflammation and internal hemorrhoids - bx negative, recall 5 yrs recommended. No complications from the hemorrhoids are reported.    She has a pancreatic cyst.    SOCIAL HISTORY  Exercise: Walks a lot  Alcohol: Drinks alcohol      Past Medical History:   Diagnosis Date    Anemia 1962    Hospitalized /transfusions    Cancer     Cataract     Surgery to remove November 2022    Ear fullness, bilateral     Ear itching     Hearing loss     Hyperthyroidism     Hypothyroidism 2000??    After Thyroid Ablation    Osteopenia     Skin cancer     Thyroid disorder     Tinnitus of both ears        Past Surgical History:   Procedure Laterality Date    APPENDECTOMY  1967    COLONOSCOPY  2018    COLONOSCOPY N/A 01/17/2024    Procedure: COLONOSCOPY to cecum into terminal ileum with cold biopsies;  Surgeon: Omkar Ponce MD;  Location: [facility] ENDOSCOPY;  Service: Gastroenterology;  Laterality: N/A;  pre: change in bowel habits  post: diverticulosis, proctitis, hemorrhoids    ENDOSCOPY N/A 08/22/2024    Procedure: ESOPHAGOGASTRODUODENOSCOPY WITH APC;  Surgeon: Ferdinand Paz MD;  Location: [facility] ENDOSCOPY;  Service: Gastroenterology;  Laterality: N/A;  SCHATSKIS RING, GASTRIC ULCERS, DUODENAL EROSIONS    ENDOSCOPY N/A 11/18/2024    Procedure: ESOPHAGOGASTRODUODENOSCOPY with bx and apc;  Surgeon: Ferdinand Paz MD;  Location: [facility] ENDOSCOPY;  Service: Gastroenterology;  Laterality: N/A;  antrum and body bx, apc of duodenum    EYE SURGERY  Nov 2022    Cararacts  "removed    SQUAMOUS CELL CARCINOMA EXCISION Right 2015    RIGHT AMBROSIO       No Known Allergies    Family History   Problem Relation Age of Onset    Cancer Mother         Cancer on a tumor at top of her stomach. Tumor removed. Cancer never came back.    Osteoporosis Mother     Arthritis Mother     Stroke Father     Heart disease Father         Triple by-pass; pace maker    Cancer Father         Squamous cell on lip. Cancer removed.    Skin cancer Father 80    Esophageal cancer Maternal Grandfather     Colon cancer Neg Hx         Social History     Tobacco Use    Smoking status: Never     Passive exposure: Never    Smokeless tobacco: Never   Vaping Use    Vaping status: Never Used   Substance Use Topics    Alcohol use: Yes     Alcohol/week: 4.0 - 6.0 standard drinks of alcohol     Types: 4 - 6 Glasses of wine per week     Comment: 1-7 DRINKS PER WEEK    Drug use: Never       Objective     Vital Signs:   /79 (BP Location: Left arm, Patient Position: Sitting, Cuff Size: Adult)   Pulse 62   Ht 167.6 cm (65.98\")   Wt 65.5 kg (144 lb 6.4 oz)   SpO2 96%   BMI 23.32 kg/m²       Physical Exam  Constitutional:       General: The patient is not in acute distress.     Appearance: Normal appearance.   HENT:      Head: Normocephalic and atraumatic.      Nose: Nose normal.   Pulmonary:      Effort: Pulmonary effort is normal. No respiratory distress.   Abdominal:      General: Abdomen is flat.      Palpations: Abdomen is soft. There is no mass.      Tenderness: There is no abdominal tenderness. There is no guarding.   Musculoskeletal:      Cervical back: Neck supple.      Right lower leg: No edema.      Left lower leg: No edema.   Skin:     General: Skin is warm and dry.   Neurological:      General: No focal deficit present.      Mental Status: The patient is alert and oriented to person, place, and time.      Gait: Gait normal.   Psychiatric:         Mood and Affect: Mood normal.         Speech: Speech normal.         " Behavior: Behavior normal.         Thought Content: Thought content normal.     Result Review :   The following data was reviewed by: TREVER Zambrano on 05/22/2025:    CBC w/diff          8/22/2024    04:57 8/22/2024    19:49 8/23/2024    04:23 8/23/2024    08:05 1/15/2025    06:38   CBC w/Diff   WBC 4.27   4.25   4.31    RBC 2.85   2.94   4.84    Hemoglobin 8.8     8.8  9.0  9.0  9.8  13.4    Hematocrit 26.7     26.7  27.0  27.8  29.3  41.5    MCV 93.7   94.6   85.7    MCH 30.9   30.6   27.7    MCHC 33.0   32.4   32.3    RDW 12.9   13.0   16.1    Platelets 181   191   270    Neutrophil Rel % 58.5        Immature Granulocyte Rel % 0.5        Lymphocyte Rel % 30.9        Monocyte Rel % 7.5        Eosinophil Rel % 1.9        Basophil Rel % 0.7          CMP          8/23/2024    04:23 11/7/2024    16:19 1/15/2025    06:38   CMP   Glucose 100   93    BUN 12   11    Creatinine 0.89  1.00  0.97    EGFR 71.2  61.9  64.2    Sodium 141   139    Potassium 4.1   4.2    Chloride 106   104    Calcium 8.8   9.7    Total Protein 5.5   6.9    Albumin 3.5   4.3    Globulin 2.0   2.6    Total Bilirubin 0.2   0.5    Alkaline Phosphatase 39   47    AST (SGOT) 14   21    ALT (SGPT) 6   7    Albumin/Globulin Ratio 1.8   1.7    BUN/Creatinine Ratio 13.5   11.3    Anion Gap 6.7   6.5        Iron Profile   Iron   Date Value Ref Range Status   01/15/2025 99 37 - 145 mcg/dL Final     TIBC   Date Value Ref Range Status   01/15/2025 425 298 - 536 mcg/dL Final     Iron Saturation (TSAT)   Date Value Ref Range Status   01/15/2025 23 20 - 50 % Final     Transferrin   Date Value Ref Range Status   01/15/2025 285 200 - 360 mg/dL Final     Ferritin   Ferritin   Date Value Ref Range Status   01/15/2025 40.40 13.00 - 150.00 ng/mL Final               Assessment and Plan    Diagnoses and all orders for this visit:    1. Pancreatic cyst (Primary)  -     Ambulatory Referral to Gastroenterology    2. History of gastric ulcer    3. Ailyn's  ring    4. AVM (arteriovenous malformation) of small bowel, acquired    Other orders  -     pantoprazole (Protonix) 20 MG EC tablet; Take 1 tablet by mouth Daily.  Dispense: 90 tablet; Refill: 1          Assessment & Plan  1. Pancreatic cyst:  - Pancreatic cyst identified on CT scan and confirmed by MRI in 11/2024.  - Endoscopic ultrasound recommended to obtain fluid from the cyst for analysis.  - Referral to Dr. Lara, gastroenterologist at [facility], for the procedure.    2. Stomach ulcers:  - Pt is doing great with no sx currently.  - Pantoprazole dosage reduced from 40 mg to 20 mg daily d/t long term risks; pt has osteopenia.  - Continue avoiding NSAIDs; use Tylenol for pain management.  - Abstain from alcohol to reduce the risk of gastritis and other gastrointestinal issues.    3. Internal hemorrhoids:  - No symptoms such as bleeding or discomfort.  - Maintain a high-fiber diet and stay hydrated to prevent exacerbation.  - Next colonoscopy 1/2029    Follow-up:  - 05/2026.            Follow Up   Return in about 1 year (around 5/22/2026).    Patient or patient representative verbalized consent for the use of Ambient Listening during the visit with  TREVER Zambrano for chart documentation. 5/22/2025  17:23 EDT    Patient was given instructions and counseling regarding her condition or for health maintenance advice. Please see specific information pulled into the AVS if appropriate.

## 2025-06-16 RX ORDER — PANTOPRAZOLE SODIUM 20 MG/1
20 TABLET, DELAYED RELEASE ORAL DAILY
Qty: 90 TABLET | Refills: 1 | OUTPATIENT
Start: 2025-06-16

## 2025-08-12 ENCOUNTER — TELEPHONE (OUTPATIENT)
Dept: ADMINISTRATIVE | Facility: OTHER | Age: 70
End: 2025-08-12
Payer: MEDICARE

## (undated) DEVICE — SOL IRRG H2O PL/BG 1000ML STRL

## (undated) DEVICE — SENSR O2 OXIMAX FNGR A/ 18IN NONSTR

## (undated) DEVICE — SINGLE-USE BIOPSY FORCEPS: Brand: RADIAL JAW 4

## (undated) DEVICE — LINER SURG CANSTR SXN S/RIGD 1500CC

## (undated) DEVICE — ADAPT CLN BIOGUARD AIR/H2O DISP

## (undated) DEVICE — BLCK/BITE BLOX WO/DENTL/RIM W/STRAP 54F

## (undated) DEVICE — Device

## (undated) DEVICE — PAD E/S GRND SGL/FOIL 9FT/CORD DISP

## (undated) DEVICE — CONN JET HYDRA H20 AUXILIARY DISP

## (undated) DEVICE — TUBING, SUCTION, 1/4" X 10', STRAIGHT: Brand: MEDLINE

## (undated) DEVICE — Device: Brand: DEFENDO AIR/WATER/SUCTION AND BIOPSY VALVE

## (undated) DEVICE — SOLIDIFIER LIQLOC PLS 1500CC BT

## (undated) DEVICE — FIAPC® PROBE W/ FILTER 2200 A OD 2.3MM/6.9FR; L 2.2M/7.2FT: Brand: ERBE

## (undated) DEVICE — KT ORCA ORCAPOD DISP STRL

## (undated) DEVICE — CANN O2 ETCO2 FITS ALL CONN CO2 SMPL A/ 7IN DISP LF

## (undated) DEVICE — LN SMPL CO2 SHTRM SD STREAM W/M LUER